# Patient Record
Sex: MALE | Race: BLACK OR AFRICAN AMERICAN | NOT HISPANIC OR LATINO | Employment: FULL TIME | URBAN - METROPOLITAN AREA
[De-identification: names, ages, dates, MRNs, and addresses within clinical notes are randomized per-mention and may not be internally consistent; named-entity substitution may affect disease eponyms.]

---

## 2017-09-21 ENCOUNTER — HOSPITAL ENCOUNTER (OUTPATIENT)
Facility: HOSPITAL | Age: 57
Discharge: HOME | End: 2017-09-23
Attending: EMERGENCY MEDICINE | Admitting: INTERNAL MEDICINE
Payer: COMMERCIAL

## 2017-09-21 ENCOUNTER — TELEPHONE (OUTPATIENT)
Dept: PULMONOLOGY | Facility: HOSPITAL | Age: 57
End: 2017-09-21

## 2017-09-21 DIAGNOSIS — R04.2 HEMOPTYSIS: ICD-10-CM

## 2017-09-21 DIAGNOSIS — J18.9 PNEUMONIA OF LEFT UPPER LOBE DUE TO INFECTIOUS ORGANISM: Primary | ICD-10-CM

## 2017-09-21 DIAGNOSIS — I25.10 CAD (CORONARY ARTERY DISEASE): ICD-10-CM

## 2017-09-21 PROBLEM — I10 ESSENTIAL HYPERTENSION: Chronic | Status: ACTIVE | Noted: 2017-09-21

## 2017-09-21 PROBLEM — E78.5 HYPERLIPIDEMIA: Chronic | Status: ACTIVE | Noted: 2017-09-21

## 2017-09-21 PROBLEM — Z72.0 TOBACCO USE: Chronic | Status: ACTIVE | Noted: 2017-09-21

## 2017-09-21 LAB
ALBUMIN SERPL BCP-MCNC: 3.7 G/DL
ALP SERPL-CCNC: 59 U/L
ALT SERPL W/O P-5'-P-CCNC: 21 U/L
ANION GAP SERPL CALC-SCNC: 10 MMOL/L
APTT BLDCRRT: 22.7 SEC
AST SERPL-CCNC: 16 U/L
BASOPHILS # BLD AUTO: 0 K/UL
BASOPHILS # BLD AUTO: 0.01 K/UL
BASOPHILS NFR BLD: 0 %
BASOPHILS NFR BLD: 0.1 %
BILIRUB SERPL-MCNC: 0.4 MG/DL
BNP SERPL-MCNC: 33 PG/ML
BUN SERPL-MCNC: 12 MG/DL
CALCIUM SERPL-MCNC: 8.9 MG/DL
CHLORIDE SERPL-SCNC: 105 MMOL/L
CO2 SERPL-SCNC: 26 MMOL/L
CREAT SERPL-MCNC: 0.9 MG/DL
DIFFERENTIAL METHOD: ABNORMAL
DIFFERENTIAL METHOD: ABNORMAL
EOSINOPHIL # BLD AUTO: 0 K/UL
EOSINOPHIL # BLD AUTO: 0 K/UL
EOSINOPHIL NFR BLD: 0 %
EOSINOPHIL NFR BLD: 0 %
ERYTHROCYTE [DISTWIDTH] IN BLOOD BY AUTOMATED COUNT: 14 %
ERYTHROCYTE [DISTWIDTH] IN BLOOD BY AUTOMATED COUNT: 14 %
EST. GFR  (AFRICAN AMERICAN): >60 ML/MIN/1.73 M^2
EST. GFR  (NON AFRICAN AMERICAN): >60 ML/MIN/1.73 M^2
GLUCOSE SERPL-MCNC: 110 MG/DL
HCT VFR BLD AUTO: 43 %
HCT VFR BLD AUTO: 43.4 %
HGB BLD-MCNC: 15.1 G/DL
HGB BLD-MCNC: 15.4 G/DL
INR PPP: 1
LYMPHOCYTES # BLD AUTO: 1.7 K/UL
LYMPHOCYTES # BLD AUTO: 2.5 K/UL
LYMPHOCYTES NFR BLD: 16.7 %
LYMPHOCYTES NFR BLD: 22.2 %
MCH RBC QN AUTO: 30.3 PG
MCH RBC QN AUTO: 30.5 PG
MCHC RBC AUTO-ENTMCNC: 35.1 G/DL
MCHC RBC AUTO-ENTMCNC: 35.5 G/DL
MCV RBC AUTO: 85 FL
MCV RBC AUTO: 87 FL
MONOCYTES # BLD AUTO: 0.1 K/UL
MONOCYTES # BLD AUTO: 0.8 K/UL
MONOCYTES NFR BLD: 1.2 %
MONOCYTES NFR BLD: 6.7 %
NEUTROPHILS # BLD AUTO: 8.1 K/UL
NEUTROPHILS # BLD AUTO: 8.5 K/UL
NEUTROPHILS NFR BLD: 70.8 %
NEUTROPHILS NFR BLD: 82.1 %
PLATELET # BLD AUTO: 217 K/UL
PLATELET # BLD AUTO: 230 K/UL
PMV BLD AUTO: 10.3 FL
PMV BLD AUTO: 10.6 FL
POTASSIUM SERPL-SCNC: 3.8 MMOL/L
PROT SERPL-MCNC: 7.6 G/DL
PROTHROMBIN TIME: 10.6 SEC
RBC # BLD AUTO: 4.95 M/UL
RBC # BLD AUTO: 5.09 M/UL
SODIUM SERPL-SCNC: 141 MMOL/L
WBC # BLD AUTO: 10.39 K/UL
WBC # BLD AUTO: 11.42 K/UL

## 2017-09-21 PROCEDURE — 63600175 PHARM REV CODE 636 W HCPCS: Performed by: EMERGENCY MEDICINE

## 2017-09-21 PROCEDURE — 25000003 PHARM REV CODE 250: Performed by: EMERGENCY MEDICINE

## 2017-09-21 PROCEDURE — 85025 COMPLETE CBC W/AUTO DIFF WBC: CPT

## 2017-09-21 PROCEDURE — 80053 COMPREHEN METABOLIC PANEL: CPT

## 2017-09-21 PROCEDURE — 99285 EMERGENCY DEPT VISIT HI MDM: CPT | Mod: 25

## 2017-09-21 PROCEDURE — 85730 THROMBOPLASTIN TIME PARTIAL: CPT

## 2017-09-21 PROCEDURE — 94640 AIRWAY INHALATION TREATMENT: CPT

## 2017-09-21 PROCEDURE — 25000242 PHARM REV CODE 250 ALT 637 W/ HCPCS: Performed by: EMERGENCY MEDICINE

## 2017-09-21 PROCEDURE — 96365 THER/PROPH/DIAG IV INF INIT: CPT

## 2017-09-21 PROCEDURE — 87040 BLOOD CULTURE FOR BACTERIA: CPT

## 2017-09-21 PROCEDURE — 83880 ASSAY OF NATRIURETIC PEPTIDE: CPT

## 2017-09-21 PROCEDURE — 96375 TX/PRO/DX INJ NEW DRUG ADDON: CPT

## 2017-09-21 PROCEDURE — 85610 PROTHROMBIN TIME: CPT

## 2017-09-21 PROCEDURE — 36415 COLL VENOUS BLD VENIPUNCTURE: CPT

## 2017-09-21 PROCEDURE — 25000003 PHARM REV CODE 250: Performed by: NURSE PRACTITIONER

## 2017-09-21 PROCEDURE — 25500020 PHARM REV CODE 255: Performed by: EMERGENCY MEDICINE

## 2017-09-21 PROCEDURE — 85025 COMPLETE CBC W/AUTO DIFF WBC: CPT | Mod: 91

## 2017-09-21 PROCEDURE — G0378 HOSPITAL OBSERVATION PER HR: HCPCS

## 2017-09-21 PROCEDURE — 63600175 PHARM REV CODE 636 W HCPCS: Performed by: NURSE PRACTITIONER

## 2017-09-21 PROCEDURE — 99900035 HC TECH TIME PER 15 MIN (STAT)

## 2017-09-21 RX ORDER — ASPIRIN 81 MG/1
81 TABLET ORAL DAILY
COMMUNITY
End: 2017-09-25 | Stop reason: ALTCHOICE

## 2017-09-21 RX ORDER — BENAZEPRIL HYDROCHLORIDE 40 MG/1
40 TABLET ORAL DAILY
COMMUNITY

## 2017-09-21 RX ORDER — PANTOPRAZOLE SODIUM 40 MG/10ML
40 INJECTION, POWDER, LYOPHILIZED, FOR SOLUTION INTRAVENOUS DAILY
Status: DISCONTINUED | OUTPATIENT
Start: 2017-09-22 | End: 2017-09-23 | Stop reason: HOSPADM

## 2017-09-21 RX ORDER — EZETIMIBE 10 MG/1
10 TABLET ORAL DAILY
Status: DISCONTINUED | OUTPATIENT
Start: 2017-09-22 | End: 2017-09-23 | Stop reason: HOSPADM

## 2017-09-21 RX ORDER — ATORVASTATIN CALCIUM 80 MG/1
80 TABLET, FILM COATED ORAL DAILY
COMMUNITY

## 2017-09-21 RX ORDER — METHYLPREDNISOLONE SOD SUCC 125 MG
125 VIAL (EA) INJECTION
Status: COMPLETED | OUTPATIENT
Start: 2017-09-21 | End: 2017-09-21

## 2017-09-21 RX ORDER — IPRATROPIUM BROMIDE AND ALBUTEROL SULFATE 2.5; .5 MG/3ML; MG/3ML
3 SOLUTION RESPIRATORY (INHALATION)
Status: COMPLETED | OUTPATIENT
Start: 2017-09-21 | End: 2017-09-21

## 2017-09-21 RX ORDER — ALBUTEROL SULFATE 90 UG/1
1-2 AEROSOL, METERED RESPIRATORY (INHALATION) EVERY 6 HOURS PRN
Qty: 1 INHALER | Refills: 0 | Status: SHIPPED | OUTPATIENT
Start: 2017-09-21 | End: 2017-09-23 | Stop reason: HOSPADM

## 2017-09-21 RX ORDER — CLOPIDOGREL BISULFATE 75 MG/1
75 TABLET ORAL DAILY
Status: DISCONTINUED | OUTPATIENT
Start: 2017-09-22 | End: 2017-09-22

## 2017-09-21 RX ORDER — ASPIRIN 81 MG/1
81 TABLET ORAL DAILY
Status: DISCONTINUED | OUTPATIENT
Start: 2017-09-22 | End: 2017-09-22

## 2017-09-21 RX ORDER — BENAZEPRIL HYDROCHLORIDE 20 MG/1
40 TABLET ORAL DAILY
Status: DISCONTINUED | OUTPATIENT
Start: 2017-09-22 | End: 2017-09-23 | Stop reason: HOSPADM

## 2017-09-21 RX ORDER — SODIUM CHLORIDE 9 MG/ML
INJECTION, SOLUTION INTRAVENOUS CONTINUOUS
Status: DISCONTINUED | OUTPATIENT
Start: 2017-09-21 | End: 2017-09-22

## 2017-09-21 RX ORDER — ASPIRIN 325 MG
81 TABLET, DELAYED RELEASE (ENTERIC COATED) ORAL DAILY
Status: ON HOLD | COMMUNITY
End: 2017-09-21 | Stop reason: CLARIF

## 2017-09-21 RX ORDER — MELOXICAM 15 MG/1
15 TABLET ORAL DAILY
COMMUNITY

## 2017-09-21 RX ORDER — PREDNISONE 20 MG/1
40 TABLET ORAL DAILY
Qty: 10 TABLET | Refills: 0 | Status: SHIPPED | OUTPATIENT
Start: 2017-09-21 | End: 2017-09-23 | Stop reason: HOSPADM

## 2017-09-21 RX ORDER — FUROSEMIDE 20 MG/1
20 TABLET ORAL 2 TIMES DAILY
COMMUNITY

## 2017-09-21 RX ORDER — CLOPIDOGREL BISULFATE 75 MG/1
75 TABLET ORAL DAILY
COMMUNITY

## 2017-09-21 RX ORDER — PANTOPRAZOLE SODIUM 40 MG/1
40 TABLET, DELAYED RELEASE ORAL DAILY
COMMUNITY

## 2017-09-21 RX ORDER — CHOLECALCIFEROL (VITAMIN D3) 25 MCG
2000 TABLET ORAL DAILY
COMMUNITY

## 2017-09-21 RX ORDER — NEBIVOLOL 10 MG/1
10 TABLET ORAL DAILY
Status: DISCONTINUED | OUTPATIENT
Start: 2017-09-22 | End: 2017-09-23 | Stop reason: HOSPADM

## 2017-09-21 RX ORDER — AMOXICILLIN AND CLAVULANATE POTASSIUM 875; 125 MG/1; MG/1
TABLET, FILM COATED ORAL
Refills: 0 | COMMUNITY
Start: 2017-09-20 | End: 2017-09-25 | Stop reason: ALTCHOICE

## 2017-09-21 RX ORDER — NEBIVOLOL 2.5 MG/1
10 TABLET ORAL DAILY
COMMUNITY

## 2017-09-21 RX ORDER — ONDANSETRON 2 MG/ML
4 INJECTION INTRAMUSCULAR; INTRAVENOUS EVERY 12 HOURS PRN
Status: DISCONTINUED | OUTPATIENT
Start: 2017-09-21 | End: 2017-09-23 | Stop reason: HOSPADM

## 2017-09-21 RX ORDER — FUROSEMIDE 20 MG/1
20 TABLET ORAL 2 TIMES DAILY
Status: DISCONTINUED | OUTPATIENT
Start: 2017-09-21 | End: 2017-09-23 | Stop reason: HOSPADM

## 2017-09-21 RX ORDER — EZETIMIBE 10 MG/1
10 TABLET ORAL DAILY
COMMUNITY

## 2017-09-21 RX ORDER — AMLODIPINE BESYLATE 10 MG/1
10 TABLET ORAL DAILY
COMMUNITY

## 2017-09-21 RX ORDER — ATORVASTATIN CALCIUM 40 MG/1
80 TABLET, FILM COATED ORAL DAILY
Status: DISCONTINUED | OUTPATIENT
Start: 2017-09-22 | End: 2017-09-23 | Stop reason: HOSPADM

## 2017-09-21 RX ORDER — MOXIFLOXACIN HYDROCHLORIDE 400 MG/250ML
400 INJECTION, SOLUTION INTRAVENOUS
Status: COMPLETED | OUTPATIENT
Start: 2017-09-21 | End: 2017-09-21

## 2017-09-21 RX ORDER — AMLODIPINE BESYLATE 10 MG/1
10 TABLET ORAL DAILY
Status: DISCONTINUED | OUTPATIENT
Start: 2017-09-22 | End: 2017-09-23 | Stop reason: HOSPADM

## 2017-09-21 RX ADMIN — SODIUM CHLORIDE: 0.9 INJECTION, SOLUTION INTRAVENOUS at 10:09

## 2017-09-21 RX ADMIN — DOXYCYCLINE 100 MG: 100 INJECTION, POWDER, LYOPHILIZED, FOR SOLUTION INTRAVENOUS at 10:09

## 2017-09-21 RX ADMIN — CEFTRIAXONE 1 G: 1 INJECTION, SOLUTION INTRAVENOUS at 10:09

## 2017-09-21 RX ADMIN — FUROSEMIDE 20 MG: 20 TABLET ORAL at 10:09

## 2017-09-21 RX ADMIN — IOHEXOL 100 ML: 350 INJECTION, SOLUTION INTRAVENOUS at 12:09

## 2017-09-21 RX ADMIN — MOXIFLOXACIN HYDROCHLORIDE 400 MG: 400 INJECTION, SOLUTION INTRAVENOUS at 02:09

## 2017-09-21 RX ADMIN — PHENYLEPHRINE HYDROCHLORIDE 2 SPRAY: 0.5 SPRAY NASAL at 02:09

## 2017-09-21 RX ADMIN — METHYLPREDNISOLONE SODIUM SUCCINATE 125 MG: 125 INJECTION, POWDER, FOR SOLUTION INTRAMUSCULAR; INTRAVENOUS at 11:09

## 2017-09-21 RX ADMIN — IPRATROPIUM BROMIDE AND ALBUTEROL SULFATE 3 ML: .5; 3 SOLUTION RESPIRATORY (INHALATION) at 12:09

## 2017-09-21 NOTE — ED NOTES
Call to [facility] telemetry, report given to UNIQUE Haney. Will call nurse when patient leaves with ETA.

## 2017-09-21 NOTE — ED PROVIDER NOTES
Encounter Date: 9/21/2017       History     Chief Complaint   Patient presents with    Coughing up blood     since Tuesday, saw PCP that put him on antibiotics and gave a steroid shot. Told him he needs xrays.      The history is provided by the patient.   Cough   This is a recurrent problem. The current episode started two days ago. The problem occurs every few hours. The problem has been gradually worsening. The cough is productive of sputum. There has been no fever. Associated symptoms include shortness of breath. Pertinent negatives include no chest pain, no chills, no sweats, no weight loss, no headaches, no rhinorrhea, no sore throat, no myalgias and no wheezing. Treatments tried: augmentin. He is a smoker. His past medical history does not include bronchitis, pneumonia, bronchiectasis, COPD, emphysema or asthma.     Pt saw PCP yesterday and rx'd augmentin.  Coughing intermittently - worse today and cont. To cough up blood every now and then.  Ordered a chest x-ray but pat. Did not get it done.  Went to work today and had a coughing spell c blood once again so he came to the ED for eval.      Review of patient's allergies indicates:  No Known Allergies  Past Medical History:   Diagnosis Date    Anticoagulant long-term use     Coronary artery disease     Hypercholesteremia     Hypertension      Past Surgical History:   Procedure Laterality Date    CARDIAC CATHETERIZATION      HERNIA REPAIR      KIDNEY STONE SURGERY      SHOULDER SURGERY Left      Family History   Problem Relation Age of Onset    Heart disease Mother     Stroke Father     Cancer Brother      Social History   Substance Use Topics    Smoking status: Current Every Day Smoker    Smokeless tobacco: Never Used    Alcohol use No     Review of Systems   Constitutional: Negative for chills, fever and weight loss.   HENT: Negative for rhinorrhea and sore throat.    Respiratory: Positive for cough and shortness of breath. Negative for  "wheezing.         + coughing up blood.   Cardiovascular: Negative for chest pain.   Gastrointestinal: Negative for nausea.   Genitourinary: Negative for dysuria.   Musculoskeletal: Negative for back pain and myalgias.   Skin: Negative for rash.   Neurological: Negative for weakness and headaches.   Hematological: Does not bruise/bleed easily.   All other systems reviewed and are negative.      Physical Exam     Initial Vitals [09/21/17 1114]   BP Pulse Resp Temp SpO2   (!) 140/76 68 18 99 °F (37.2 °C) 95 %      MAP       97.33         Vitals:    09/21/17 1114 09/21/17 1136 09/21/17 1215   BP: (!) 140/76     Pulse: 68 67 67   Resp: 18 18 18   Temp: 99 °F (37.2 °C)     TempSrc: Oral     SpO2: 95% 100% 100%   Weight: 99.3 kg (219 lb)     Height: 5' 10" (1.778 m)       Physical Exam    Nursing note and vitals reviewed.  Constitutional: He appears well-developed and well-nourished.   HENT:   Head: Normocephalic and atraumatic.   Mouth/Throat: Oropharynx is clear and moist.   Eyes: EOM are normal. Pupils are equal, round, and reactive to light.   Neck: Normal range of motion. Neck supple.   Cardiovascular: Normal rate, regular rhythm, normal heart sounds and intact distal pulses.   Pulmonary/Chest: No accessory muscle usage. No tachypnea. No respiratory distress. He has wheezes. He has no rhonchi.   Abdominal: Soft. Bowel sounds are normal. There is no rebound.   Musculoskeletal: Normal range of motion. He exhibits no edema.   Neurological: He is alert and oriented to person, place, and time. He has normal strength. No cranial nerve deficit or sensory deficit.   Skin: Skin is warm and dry. No rash noted.   Psychiatric: He has a normal mood and affect. His behavior is normal. Judgment and thought content normal.         ED Course   Procedures  Labs Reviewed   CBC W/ AUTO DIFFERENTIAL   COMPREHENSIVE METABOLIC PANEL   PROTIME-INR   APTT        Results for orders placed or performed during the hospital encounter of 09/21/17 "   CBC auto differential   Result Value Ref Range    WBC 11.42 3.90 - 12.70 K/uL    RBC 5.09 4.60 - 6.20 M/uL    Hemoglobin 15.4 14.0 - 18.0 g/dL    Hematocrit 43.4 40.0 - 54.0 %    MCV 85 82 - 98 fL    MCH 30.3 27.0 - 31.0 pg    MCHC 35.5 32.0 - 36.0 g/dL    RDW 14.0 11.5 - 14.5 %    Platelets 230 150 - 350 K/uL    MPV 10.6 9.2 - 12.9 fL    Gran # 8.1 (H) 1.8 - 7.7 K/uL    Lymph # 2.5 1.0 - 4.8 K/uL    Mono # 0.8 0.3 - 1.0 K/uL    Eos # 0.0 0.0 - 0.5 K/uL    Baso # 0.01 0.00 - 0.20 K/uL    Gran% 70.8 38.0 - 73.0 %    Lymph% 22.2 18.0 - 48.0 %    Mono% 6.7 4.0 - 15.0 %    Eosinophil% 0.0 0.0 - 8.0 %    Basophil% 0.1 0.0 - 1.9 %    Differential Method Automated    Comprehensive metabolic panel   Result Value Ref Range    Sodium 141 136 - 145 mmol/L    Potassium 3.8 3.5 - 5.1 mmol/L    Chloride 105 95 - 110 mmol/L    CO2 26 23 - 29 mmol/L    Glucose 110 70 - 110 mg/dL    BUN, Bld 12 6 - 20 mg/dL    Creatinine 0.9 0.5 - 1.4 mg/dL    Calcium 8.9 8.7 - 10.5 mg/dL    Total Protein 7.6 6.0 - 8.4 g/dL    Albumin 3.7 3.5 - 5.2 g/dL    Total Bilirubin 0.4 0.1 - 1.0 mg/dL    Alkaline Phosphatase 59 55 - 135 U/L    AST 16 10 - 40 U/L    ALT 21 10 - 44 U/L    Anion Gap 10 8 - 16 mmol/L    eGFR if African American >60.0 >60 mL/min/1.73 m^2    eGFR if non African American >60.0 >60 mL/min/1.73 m^2   Protime-INR   Result Value Ref Range    Prothrombin Time 10.6 9.0 - 12.5 sec    INR 1.0 0.8 - 1.2   APTT   Result Value Ref Range    aPTT 22.7 21.0 - 32.0 sec   Brain natriuretic peptide   Result Value Ref Range    BNP 33 0 - 99 pg/mL                   Imaging Results          CTA Chest Non-Coronary (PE Study) (Final result)  Result time 09/21/17 12:59:16    Final result by Titus Snyder MD (09/21/17 12:59:16)                 Impression:        No pulmonary embolus.   Faint groundglass opacities in the left upper lobe, likely infectious or inflammatory in nature.  Probable bilateral adrenal adenomas this could be confirmed with MRI.         All CT scans at this facility use dose modulation, iterative reconstruction and/or weight based dosing when appropriate to reduce radiation dose to as low as reasonably achievable.       Electronically signed by: GINA MONTESINOS MD  Date:     09/21/17  Time:    12:59              Narrative:    Exam: CTA CHEST NON CORONARY    Clinical History:   Hemoptysis.  Rule out pulmonary embolus.    Technique: Axial CTA images performed through the chest after the administration of 100 cc intravenous contrast. Maximum intensity projections were performed and interpreted.    Findings:        There is no evidence of pulmonary embolus. Pulmonary artery caliber is normal. The heart, great vessels, and mediastinal structures are within normal limits. No thoracic adenopathy.    Faint groundglass opacities in the left upper lobe.  Mild centrilobular emphysema. No pleural effusions.    Bilateral adrenal nodules measuring 2.7 cm on the left and 1.3 cm on the right.    The bones are intact.                             X-Ray Chest PA And Lateral (Final result)  Result time 09/21/17 12:38:27    Final result by Delano Loya III, MD (09/21/17 12:38:27)                 Impression:         Negative two-view chest x-ray.      Electronically signed by: DELANO LOYA MD  Date:     09/21/17  Time:    12:38              Narrative:    Two-view chest x-ray.    Clinical indication: Asthma     Heart size is normal. The lung fields are clear. No acute pulmonary infiltrate.                                      ED Course    12:46 PM patient back from CT scan vital signs stable.  He is resting company.  He has had no hemoptysis since he's been in the emergency department.    1:07 PM Dr. Rubalcava called but no answer (out to lunch) - Dr. Ramsay paged.    1:28 PM I spoke with Dr. Ramsay with pulmonology and he agrees with current management.  He states continue the patient on the Augmentin for now to treat technique required pneumonia as  well as steroids with albuterol and he will call the patient tomorrow to confirm appointment with him in his office on Monday.  I discussed this with the patient and his family and they feel very comfortable with this treatment plan.  They will return emergent department immediately for any worsening signs or symptoms.    1:47 PM prior to leaving the department the patient's coughing a little bit worse and that his hemoptysis got more significant.  Now with paige blood produced with his cough.  I discussed the case once again with Dr. Ramsay and we feel the patient will be better served to be in the hospital overnight for further evaluation and Dr. Maria will evaluate him there.    I spoke c [facility] and they agree c current tx.  They request I write admit to [facility].  The patient and his family understand we cannot admit patients to this facility and they will need to be transferred to [facility] in [place].  They're very comfortable with this treatment plan.  He will need to be transported I [facility] ambulance service for continued cardiac monitoring as well as IV fluids.    2:16 PM Sunshine with hospital medicine called me once again with concerns that the patient does not meet inpatient criteria (not hypoxic, anemic, tachycardic, etc. ) and requests that I write admission to [facility].  She inquired if the patient would be more appropriate for outpatient treatment however I do not feel it is appropriate.  The patient is much more uncomfortable now with increasing dyspnea and cough with significant paige hemoptysis requiring prompt evaluation by pulmonology and monitoring overnight at a minimum.  For now his vital signs remain stable.  He complains that he has some fullness in the back of his throat like he is choking on something and he's got bright red blood in his posterior oropharynx.  He has blood in bilateral nares as well but we feel that just because he has been coughing up the blood in his posterior  oropharynx and as gotten into his naris posteriorly.      4:20 PM we are still waiting for him transport have a patient's bed is ready and the call for transport has been made.  The patient looks much improved.  He is resting comfortably with his family has not having any problems breathing and we've seen no additional episodes of significant bleeding/hemoptysis.  Vital Signs are stable.    1641 - [facility] here to transfer the patient to [facility] in [place].  The patient remained comfortable with stable vital signs.    Clinical Impression:   The primary encounter diagnosis was Pneumonia of left upper lobe due to infectious organism. A diagnosis of Hemoptysis was also pertinent to this visit.    Disposition:   Disposition: Admitted  Condition: Stable                        Alvin Zavaleta MD  09/21/17 1330       Alvin Zavaleta MD  09/21/17 1356       Alvin Zavaleta MD  09/21/17 1621       Alvin Zavaleta MD  09/21/17 1719       Alvin Zavaleta MD  09/21/17 1719

## 2017-09-22 ENCOUNTER — ANESTHESIA EVENT (OUTPATIENT)
Dept: ENDOSCOPY | Facility: HOSPITAL | Age: 57
End: 2017-09-22
Payer: COMMERCIAL

## 2017-09-22 ENCOUNTER — ANESTHESIA (OUTPATIENT)
Dept: ENDOSCOPY | Facility: HOSPITAL | Age: 57
End: 2017-09-22
Payer: COMMERCIAL

## 2017-09-22 VITALS — RESPIRATION RATE: 27 BRPM

## 2017-09-22 PROBLEM — I25.10 CAD (CORONARY ARTERY DISEASE): Chronic | Status: ACTIVE | Noted: 2017-09-22

## 2017-09-22 PROBLEM — J44.0 EMPHYSEMA WITH BOTH ACUTE AND CHRONIC BRONCHITIS: Chronic | Status: ACTIVE | Noted: 2017-09-22

## 2017-09-22 PROBLEM — J20.9 EMPHYSEMA WITH BOTH ACUTE AND CHRONIC BRONCHITIS: Status: ACTIVE | Noted: 2017-09-22

## 2017-09-22 PROBLEM — J43.9 EMPHYSEMA WITH BOTH ACUTE AND CHRONIC BRONCHITIS: Chronic | Status: ACTIVE | Noted: 2017-09-22

## 2017-09-22 PROBLEM — J43.9 EMPHYSEMA WITH BOTH ACUTE AND CHRONIC BRONCHITIS: Status: ACTIVE | Noted: 2017-09-22

## 2017-09-22 PROBLEM — J44.0 EMPHYSEMA WITH BOTH ACUTE AND CHRONIC BRONCHITIS: Status: ACTIVE | Noted: 2017-09-22

## 2017-09-22 PROBLEM — J20.9 EMPHYSEMA WITH BOTH ACUTE AND CHRONIC BRONCHITIS: Chronic | Status: ACTIVE | Noted: 2017-09-22

## 2017-09-22 LAB
ALBUMIN SERPL BCP-MCNC: 3.1 G/DL
ALP SERPL-CCNC: 51 U/L
ALT SERPL W/O P-5'-P-CCNC: 16 U/L
ANION GAP SERPL CALC-SCNC: 8 MMOL/L
APPEARANCE FLD: ABNORMAL
AST SERPL-CCNC: 13 U/L
BASOPHILS # BLD AUTO: 0.01 K/UL
BASOPHILS NFR BLD: 0.1 %
BILIRUB SERPL-MCNC: 0.3 MG/DL
BODY FLD TYPE: ABNORMAL
BUN SERPL-MCNC: 21 MG/DL
CALCIUM SERPL-MCNC: 8.6 MG/DL
CHLORIDE SERPL-SCNC: 109 MMOL/L
CO2 SERPL-SCNC: 24 MMOL/L
COLOR FLD: ABNORMAL
CREAT SERPL-MCNC: 0.8 MG/DL
DIFFERENTIAL METHOD: ABNORMAL
EOSINOPHIL # BLD AUTO: 0 K/UL
EOSINOPHIL NFR BLD: 0 %
ERYTHROCYTE [DISTWIDTH] IN BLOOD BY AUTOMATED COUNT: 13.9 %
EST. GFR  (AFRICAN AMERICAN): >60 ML/MIN/1.73 M^2
EST. GFR  (NON AFRICAN AMERICAN): >60 ML/MIN/1.73 M^2
GLUCOSE SERPL-MCNC: 104 MG/DL
HCT VFR BLD AUTO: 39.8 %
HGB BLD-MCNC: 13.7 G/DL
LYMPHOCYTES # BLD AUTO: 2.9 K/UL
LYMPHOCYTES NFR BLD: 19.2 %
LYMPHOCYTES NFR FLD MANUAL: 5 %
MCH RBC QN AUTO: 30.6 PG
MCHC RBC AUTO-ENTMCNC: 34.4 G/DL
MCV RBC AUTO: 89 FL
MONOCYTES # BLD AUTO: 1 K/UL
MONOCYTES NFR BLD: 6.9 %
MONOS+MACROS NFR FLD MANUAL: 5 %
NEUTROPHILS # BLD AUTO: 11 K/UL
NEUTROPHILS NFR BLD: 73.8 %
OTHER CELLS FLD MANUAL: 90 %
PATH INTERP FLD-IMP: NORMAL
PLATELET # BLD AUTO: 222 K/UL
PMV BLD AUTO: 10.3 FL
POTASSIUM SERPL-SCNC: 4.1 MMOL/L
PROT SERPL-MCNC: 6.5 G/DL
RBC # BLD AUTO: 4.47 M/UL
SODIUM SERPL-SCNC: 141 MMOL/L
WBC # BLD AUTO: 14.84 K/UL
WBC # FLD: 465 /CU MM

## 2017-09-22 PROCEDURE — G0378 HOSPITAL OBSERVATION PER HR: HCPCS

## 2017-09-22 PROCEDURE — 37000009 HC ANESTHESIA EA ADD 15 MINS: Performed by: INTERNAL MEDICINE

## 2017-09-22 PROCEDURE — 25000003 PHARM REV CODE 250: Performed by: FAMILY MEDICINE

## 2017-09-22 PROCEDURE — 25000003 PHARM REV CODE 250: Performed by: NURSE ANESTHETIST, CERTIFIED REGISTERED

## 2017-09-22 PROCEDURE — 25000003 PHARM REV CODE 250: Performed by: INTERNAL MEDICINE

## 2017-09-22 PROCEDURE — 93005 ELECTROCARDIOGRAM TRACING: CPT

## 2017-09-22 PROCEDURE — 80053 COMPREHEN METABOLIC PANEL: CPT

## 2017-09-22 PROCEDURE — 25000003 PHARM REV CODE 250: Performed by: NURSE PRACTITIONER

## 2017-09-22 PROCEDURE — 85025 COMPLETE CBC W/AUTO DIFF WBC: CPT

## 2017-09-22 PROCEDURE — 87107 FUNGI IDENTIFICATION MOLD: CPT

## 2017-09-22 PROCEDURE — 93010 ELECTROCARDIOGRAM REPORT: CPT | Mod: ,,, | Performed by: INTERNAL MEDICINE

## 2017-09-22 PROCEDURE — 31624 DX BRONCHOSCOPE/LAVAGE: CPT | Mod: RT | Performed by: INTERNAL MEDICINE

## 2017-09-22 PROCEDURE — 87116 MYCOBACTERIA CULTURE: CPT | Mod: 59

## 2017-09-22 PROCEDURE — 99205 OFFICE O/P NEW HI 60 MIN: CPT | Mod: 25,,, | Performed by: ORTHOPAEDIC SURGERY

## 2017-09-22 PROCEDURE — 25000242 PHARM REV CODE 250 ALT 637 W/ HCPCS: Performed by: NURSE PRACTITIONER

## 2017-09-22 PROCEDURE — 94640 AIRWAY INHALATION TREATMENT: CPT

## 2017-09-22 PROCEDURE — 88305 TISSUE EXAM BY PATHOLOGIST: CPT | Mod: 26,,, | Performed by: PATHOLOGY

## 2017-09-22 PROCEDURE — 63600175 PHARM REV CODE 636 W HCPCS: Performed by: EMERGENCY MEDICINE

## 2017-09-22 PROCEDURE — 88305 TISSUE EXAM BY PATHOLOGIST: CPT | Performed by: PATHOLOGY

## 2017-09-22 PROCEDURE — C9113 INJ PANTOPRAZOLE SODIUM, VIA: HCPCS | Performed by: EMERGENCY MEDICINE

## 2017-09-22 PROCEDURE — 87070 CULTURE OTHR SPECIMN AEROBIC: CPT | Mod: 59

## 2017-09-22 PROCEDURE — 89051 BODY FLUID CELL COUNT: CPT

## 2017-09-22 PROCEDURE — 87210 SMEAR WET MOUNT SALINE/INK: CPT | Mod: 91

## 2017-09-22 PROCEDURE — 63600175 PHARM REV CODE 636 W HCPCS: Performed by: NURSE ANESTHETIST, CERTIFIED REGISTERED

## 2017-09-22 PROCEDURE — 31575 DIAGNOSTIC LARYNGOSCOPY: CPT | Mod: ,,, | Performed by: ORTHOPAEDIC SURGERY

## 2017-09-22 PROCEDURE — 87205 SMEAR GRAM STAIN: CPT | Mod: 59

## 2017-09-22 PROCEDURE — 37000008 HC ANESTHESIA 1ST 15 MINUTES: Performed by: INTERNAL MEDICINE

## 2017-09-22 PROCEDURE — 63600175 PHARM REV CODE 636 W HCPCS: Performed by: NURSE PRACTITIONER

## 2017-09-22 PROCEDURE — 25000003 PHARM REV CODE 250: Performed by: ORTHOPAEDIC SURGERY

## 2017-09-22 PROCEDURE — 36415 COLL VENOUS BLD VENIPUNCTURE: CPT

## 2017-09-22 PROCEDURE — 87102 FUNGUS ISOLATION CULTURE: CPT

## 2017-09-22 PROCEDURE — 25000003 PHARM REV CODE 250: Performed by: EMERGENCY MEDICINE

## 2017-09-22 PROCEDURE — 31624 DX BRONCHOSCOPE/LAVAGE: CPT | Mod: RT,,, | Performed by: INTERNAL MEDICINE

## 2017-09-22 PROCEDURE — 99220 PR INITIAL OBSERVATION CARE,LEVL III: CPT | Mod: 25,,, | Performed by: INTERNAL MEDICINE

## 2017-09-22 PROCEDURE — 87015 SPECIMEN INFECT AGNT CONCNTJ: CPT | Mod: 59

## 2017-09-22 PROCEDURE — 88112 CYTOPATH CELL ENHANCE TECH: CPT | Mod: 26,,, | Performed by: PATHOLOGY

## 2017-09-22 RX ORDER — MUPIROCIN 20 MG/G
OINTMENT TOPICAL 2 TIMES DAILY
Status: DISCONTINUED | OUTPATIENT
Start: 2017-09-22 | End: 2017-09-23 | Stop reason: HOSPADM

## 2017-09-22 RX ORDER — BENZONATATE 100 MG/1
100 CAPSULE ORAL 3 TIMES DAILY PRN
Status: DISCONTINUED | OUTPATIENT
Start: 2017-09-22 | End: 2017-09-23 | Stop reason: HOSPADM

## 2017-09-22 RX ORDER — SODIUM CHLORIDE 9 MG/ML
INJECTION, SOLUTION INTRAVENOUS CONTINUOUS PRN
Status: DISCONTINUED | OUTPATIENT
Start: 2017-09-22 | End: 2017-09-25

## 2017-09-22 RX ORDER — HYDROCODONE BITARTRATE AND ACETAMINOPHEN 5; 325 MG/1; MG/1
1 TABLET ORAL EVERY 4 HOURS PRN
Status: DISCONTINUED | OUTPATIENT
Start: 2017-09-22 | End: 2017-09-23 | Stop reason: HOSPADM

## 2017-09-22 RX ORDER — PROPOFOL 10 MG/ML
VIAL (ML) INTRAVENOUS
Status: DISCONTINUED | OUTPATIENT
Start: 2017-09-22 | End: 2017-09-25

## 2017-09-22 RX ORDER — LIDOCAINE HYDROCHLORIDE 10 MG/ML
INJECTION INFILTRATION; PERINEURAL
Status: DISCONTINUED | OUTPATIENT
Start: 2017-09-22 | End: 2017-09-25

## 2017-09-22 RX ORDER — FENTANYL CITRATE 50 UG/ML
INJECTION, SOLUTION INTRAMUSCULAR; INTRAVENOUS
Status: DISCONTINUED | OUTPATIENT
Start: 2017-09-22 | End: 2017-09-25

## 2017-09-22 RX ORDER — LIDOCAINE HYDROCHLORIDE 40 MG/ML
4 SOLUTION TOPICAL ONCE
Status: COMPLETED | OUTPATIENT
Start: 2017-09-22 | End: 2017-09-22

## 2017-09-22 RX ORDER — SODIUM CHLORIDE, SODIUM LACTATE, POTASSIUM CHLORIDE, CALCIUM CHLORIDE 600; 310; 30; 20 MG/100ML; MG/100ML; MG/100ML; MG/100ML
INJECTION, SOLUTION INTRAVENOUS CONTINUOUS
Status: DISCONTINUED | OUTPATIENT
Start: 2017-09-22 | End: 2017-09-22

## 2017-09-22 RX ORDER — MIDAZOLAM HYDROCHLORIDE 1 MG/ML
INJECTION, SOLUTION INTRAMUSCULAR; INTRAVENOUS
Status: DISCONTINUED | OUTPATIENT
Start: 2017-09-22 | End: 2017-09-25

## 2017-09-22 RX ORDER — FUROSEMIDE 10 MG/ML
40 INJECTION INTRAMUSCULAR; INTRAVENOUS ONCE
Status: COMPLETED | OUTPATIENT
Start: 2017-09-22 | End: 2017-09-22

## 2017-09-22 RX ORDER — FUROSEMIDE 10 MG/ML
20 INJECTION INTRAMUSCULAR; INTRAVENOUS ONCE
Status: DISCONTINUED | OUTPATIENT
Start: 2017-09-22 | End: 2017-09-22

## 2017-09-22 RX ORDER — IPRATROPIUM BROMIDE AND ALBUTEROL SULFATE 2.5; .5 MG/3ML; MG/3ML
3 SOLUTION RESPIRATORY (INHALATION) EVERY 6 HOURS PRN
Status: DISCONTINUED | OUTPATIENT
Start: 2017-09-22 | End: 2017-09-23 | Stop reason: HOSPADM

## 2017-09-22 RX ORDER — GLYCOPYRROLATE 0.2 MG/ML
INJECTION INTRAMUSCULAR; INTRAVENOUS
Status: DISCONTINUED | OUTPATIENT
Start: 2017-09-22 | End: 2017-09-25

## 2017-09-22 RX ADMIN — PROPOFOL 200 MG: 10 INJECTION, EMULSION INTRAVENOUS at 10:09

## 2017-09-22 RX ADMIN — SODIUM CHLORIDE: 0.9 INJECTION, SOLUTION INTRAVENOUS at 08:09

## 2017-09-22 RX ADMIN — HYDROCODONE BITARTRATE AND ACETAMINOPHEN 1 TABLET: 5; 325 TABLET ORAL at 03:09

## 2017-09-22 RX ADMIN — SODIUM CHLORIDE: 0.9 INJECTION, SOLUTION INTRAVENOUS at 10:09

## 2017-09-22 RX ADMIN — DOXYCYCLINE 100 MG: 100 INJECTION, POWDER, LYOPHILIZED, FOR SOLUTION INTRAVENOUS at 12:09

## 2017-09-22 RX ADMIN — SODIUM CHLORIDE, SODIUM LACTATE, POTASSIUM CHLORIDE, AND CALCIUM CHLORIDE: .6; .31; .03; .02 INJECTION, SOLUTION INTRAVENOUS at 10:09

## 2017-09-22 RX ADMIN — FENTANYL CITRATE 50 MCG: 50 INJECTION, SOLUTION INTRAMUSCULAR; INTRAVENOUS at 10:09

## 2017-09-22 RX ADMIN — IPRATROPIUM BROMIDE AND ALBUTEROL SULFATE 3 ML: .5; 3 SOLUTION RESPIRATORY (INHALATION) at 02:09

## 2017-09-22 RX ADMIN — MUPIROCIN: 20 OINTMENT TOPICAL at 08:09

## 2017-09-22 RX ADMIN — SALINE NASAL SPRAY 2 SPRAY: 1.5 SOLUTION NASAL at 04:09

## 2017-09-22 RX ADMIN — FUROSEMIDE 20 MG: 20 TABLET ORAL at 03:09

## 2017-09-22 RX ADMIN — DOXYCYCLINE 100 MG: 100 INJECTION, POWDER, LYOPHILIZED, FOR SOLUTION INTRAVENOUS at 08:09

## 2017-09-22 RX ADMIN — SALINE NASAL SPRAY 2 SPRAY: 1.5 SOLUTION NASAL at 03:09

## 2017-09-22 RX ADMIN — CEFTRIAXONE 1 G: 1 INJECTION, SOLUTION INTRAVENOUS at 08:09

## 2017-09-22 RX ADMIN — PANTOPRAZOLE SODIUM 40 MG: 40 INJECTION, POWDER, FOR SOLUTION INTRAVENOUS at 03:09

## 2017-09-22 RX ADMIN — PROPOFOL 50 MG: 10 INJECTION, EMULSION INTRAVENOUS at 10:09

## 2017-09-22 RX ADMIN — LIDOCAINE HYDROCHLORIDE 50 MG: 10 INJECTION, SOLUTION INFILTRATION; PERINEURAL at 10:09

## 2017-09-22 RX ADMIN — LIDOCAINE HYDROCHLORIDE 4 ML: 40 SOLUTION TOPICAL at 10:09

## 2017-09-22 RX ADMIN — AMLODIPINE BESYLATE 10 MG: 10 TABLET ORAL at 03:09

## 2017-09-22 RX ADMIN — GLYCOPYRROLATE 0.2 MG: 0.2 INJECTION INTRAMUSCULAR; INTRAVENOUS at 10:09

## 2017-09-22 RX ADMIN — MIDAZOLAM HYDROCHLORIDE 2 MG: 1 INJECTION, SOLUTION INTRAMUSCULAR; INTRAVENOUS at 10:09

## 2017-09-22 RX ADMIN — Medication 1 SPRAY: at 02:09

## 2017-09-22 RX ADMIN — FUROSEMIDE 40 MG: 10 INJECTION, SOLUTION INTRAMUSCULAR; INTRAVENOUS at 09:09

## 2017-09-22 NOTE — ASSESSMENT & PLAN NOTE
- No angina.  - Continue ASA, Plavix, BB, and statin therapy.  - Follows with Dr. Wells outpatient.

## 2017-09-22 NOTE — PLAN OF CARE
Problem: Patient Care Overview  Goal: Plan of Care Review  Outcome: Ongoing (interventions implemented as appropriate)  Care plan reviewed and agree with current plan of action. Adjustments made to current plan of care. Suction @ bedside; CBC this am to monitor H&H

## 2017-09-22 NOTE — HPI
"Mr. Schwab is a 55yo AA male with a PMHx of CAD with remote PCI (DAMON) of RCA in 2013, HTN, HLD, GERD, OA, and tobacco use, who presented to the ED with c/o hemoptysis since Tuesday.  Associated SOB, nose bleed, and sore throat.  Denies any chest pain, palpitations, LSOAN, wheezing, orthopnea, PND, abdominal pain, N/V/D, dysphagia, dyspepsia, rhinorrhea, lightheadedness/dizziness, syncope, weight loss, or fever.  Patient reports 5 episodes of blood streaked sputum from Tuesday to Thursday.  Thursday morning while at work, patient coughed and reports he "felt something pop loose in my throat", and noticed paige blood with clots.  While in ED, he had another episode stating he coughed up "straight blood".  He saw PCP on 9/20, given steroid and Augmentin.  Patient is on ASA and Plavix.  CT chest revealed faint groundglass opacities in the left upper lobe, likely infectious or inflammatory in nature.  H&H stable at 15/43.  Case discussed with Pulmonology.  Hospital Medicine consulted for admission.  "

## 2017-09-22 NOTE — SUBJECTIVE & OBJECTIVE
Past Medical History:   Diagnosis Date    Anticoagulant long-term use     Arthritis     Coronary artery disease     PCI (DAMON) of RCA in 2013    GERD (gastroesophageal reflux disease)     Hypercholesteremia     Hypertension     Kidney stones        Past Surgical History:   Procedure Laterality Date    CORONARY ANGIOPLASTY WITH STENT PLACEMENT  2013    HERNIA REPAIR      KIDNEY STONE SURGERY      SHOULDER SURGERY Left        Review of patient's allergies indicates:  No Known Allergies    Family History     Problem Relation (Age of Onset)    Cancer Sister    Cardiomyopathy Sister    Crohn's disease Sister    Heart disease Mother, Brother, Brother, Sister    Hypertension Father, Sister    Kidney disease Sister    Stroke Father    Valvular heart disease Mother        Social History Main Topics    Smoking status: Current Every Day Smoker     Packs/day: 0.50     Years: 35.00    Smokeless tobacco: Never Used    Alcohol use No    Drug use: No    Sexual activity: Not on file         Review of Systems   Constitutional: Negative.    HENT: Negative.    Eyes: Negative.    Respiratory: Positive for cough.    Cardiovascular: Negative.    Gastrointestinal: Negative.    Endocrine: Negative.    Genitourinary: Negative.    Musculoskeletal: Negative.    Skin: Negative.    Allergic/Immunologic: Negative.    Neurological: Negative.    Hematological: Negative.    Psychiatric/Behavioral: Negative.      Objective:     Vital Signs (Most Recent):  Temp: 97.1 °F (36.2 °C) (09/22/17 0449)  Pulse: 65 (09/22/17 0449)  Resp: 18 (09/22/17 0449)  BP: (!) 98/50 (09/22/17 0449)  SpO2: 95 % (09/22/17 0449) Vital Signs (24h Range):  Temp:  [97.1 °F (36.2 °C)-99 °F (37.2 °C)] 97.1 °F (36.2 °C)  Pulse:  [60-78] 65  Resp:  [15-32] 18  SpO2:  [94 %-100 %] 95 %  BP: ()/(50-79) 98/50     Weight: 101 kg (222 lb 9.6 oz)  Body mass index is 32.87 kg/m².      Intake/Output Summary (Last 24 hours) at 09/22/17 0809  Last data filed at 09/21/17  1528   Gross per 24 hour   Intake              250 ml   Output                0 ml   Net              250 ml       Physical Exam   Constitutional: He is oriented to person, place, and time. He appears well-developed and well-nourished. No distress.   HENT:   Head: Normocephalic and atraumatic.   Nose: Nose normal.   Mouth/Throat: Oropharynx is clear and moist. No oropharyngeal exudate.   Eyes: Conjunctivae and EOM are normal. Pupils are equal, round, and reactive to light. Left eye exhibits no discharge. No scleral icterus.   Neck: Normal range of motion. Neck supple.   Cardiovascular: Normal rate, regular rhythm and normal heart sounds.    Pulmonary/Chest: Effort normal and breath sounds normal. No respiratory distress. He has no wheezes. He has no rales. He exhibits no tenderness.   Abdominal: Soft. Bowel sounds are normal.   Musculoskeletal: Normal range of motion. He exhibits no deformity.   Neurological: He is alert and oriented to person, place, and time. No cranial nerve deficit.   Skin: Skin is warm and dry. Capillary refill takes less than 2 seconds. Nails show clubbing.   Nursing note and vitals reviewed.      Vents:       Lines/Drains/Airways     Peripheral Intravenous Line                 Peripheral IV - Single Lumen 09/21/17 1150 Right Antecubital less than 1 day         Peripheral IV - Single Lumen 09/21/17 1415 Left Antecubital less than 1 day                Significant Labs:    CBC/Anemia Profile:    Recent Labs  Lab 09/21/17  1150 09/21/17  2042 09/22/17  0543   WBC 11.42 10.39 14.84*   HGB 15.4 15.1 13.7*   HCT 43.4 43.0 39.8*    217 222   MCV 85 87 89   RDW 14.0 14.0 13.9        Chemistries:    Recent Labs  Lab 09/21/17  1150 09/22/17  0543    141   K 3.8 4.1    109   CO2 26 24   BUN 12 21*   CREATININE 0.9 0.8   CALCIUM 8.9 8.6*   ALBUMIN 3.7 3.1*   PROT 7.6 6.5   BILITOT 0.4 0.3   ALKPHOS 59 51*   ALT 21 16   AST 16 13       Respiratory Culture: No results for input(s):  GSRESP, RESPIRATORYC in the last 48 hours.  All pertinent labs within the past 24 hours have been reviewed.    Significant Imaging:   CT: I have reviewed all pertinent results/findings within the past 24 hours and my personal findings are:      There is no evidence of pulmonary embolus. Pulmonary artery caliber is normal. The heart, great vessels, and mediastinal structures are within normal limits. No thoracic adenopathy.    Faint groundglass opacities in the left upper lobe.  Mild centrilobular emphysema. No pleural effusions.    Bilateral adrenal nodules measuring 2.7 cm on the left and 1.3 cm on the right.    The bones are intact.

## 2017-09-22 NOTE — ASSESSMENT & PLAN NOTE
- H&H stable.  Monitor closely.  - Case discussed with Pulmonology.  - ASA and Plavix held.  - Bronchoscopy this AM.

## 2017-09-22 NOTE — PROGRESS NOTES
"[facility] Hospital Medicine  Progress Note    Patient Name: Stanley Schwab  MRN: [id number]  Patient Class: OP- Observation   Admission Date: 9/21/2017  Length of Stay: 0 days  Attending Physician: Rowena Alves MD  Primary Care Provider: Ever Rubalcava MD        Subjective:     Principal Problem:Hemoptysis    HPI:  Mr. Schwab is a 57yo AA male with a PMHx of CAD with remote PCI (DAMON) of RCA in 2013, HTN, HLD, GERD, OA, and tobacco use, who presented to the ED with c/o hemoptysis since Tuesday.  Associated SOB, nose bleed, and sore throat.  Denies any chest pain, palpitations, SLOAN, wheezing, orthopnea, PND, abdominal pain, N/V/D, dysphagia, dyspepsia, rhinorrhea, lightheadedness/dizziness, syncope, weight loss, or fever.  Patient reports 5 episodes of blood streaked sputum from Tuesday to Thursday.  Thursday morning while at work, patient coughed and reports he "felt something pop loose in my throat", and noticed paige blood with clots.  While in ED, he had another episode stating he coughed up "straight blood".  He saw PCP on 9/20, given steroid and Augmentin.  Patient is on ASA and Plavix.  CT chest revealed faint groundglass opacities in the left upper lobe, likely infectious or inflammatory in nature.  H&H stable at 15/43.  Case discussed with Pulmonology.  Hospital Medicine consulted for admission.    Hospital Course:  Patient admitted.  IV abx initiated.  CBC closely monitored.  This AM, patient appears comfortable without complaints.  H&H slightly down at 14/40.  VS stable.  ASA and Plavix held.  No further episodes of hemoptysis.  Case discussed with Pulmonology.  Bronchoscopy planned for this AM.        Review of Systems   Constitutional: Negative for activity change, chills, diaphoresis, fatigue, fever and unexpected weight change.   HENT: Positive for nosebleeds and sore throat. Negative for congestion, postnasal drip, rhinorrhea and trouble swallowing.    Eyes: Negative for visual " disturbance.   Respiratory: Positive for cough and shortness of breath. Negative for apnea, chest tightness and wheezing.         Hemoptysis   Cardiovascular: Negative for chest pain, palpitations and leg swelling.   Gastrointestinal: Negative for abdominal distention, abdominal pain, blood in stool, constipation, diarrhea, nausea and vomiting.        No hematemesis   Endocrine: Negative for polydipsia, polyphagia and polyuria.   Genitourinary: Negative for decreased urine volume, difficulty urinating, dysuria, frequency, hematuria and urgency.   Musculoskeletal: Negative for arthralgias, back pain, gait problem, joint swelling and myalgias.   Skin: Negative for pallor, rash and wound.   Neurological: Negative for dizziness, seizures, syncope, facial asymmetry, speech difficulty, weakness, light-headedness, numbness and headaches.   Psychiatric/Behavioral: Negative for confusion. The patient is not nervous/anxious.    All other systems reviewed and are negative.    Objective:     Vital Signs (Most Recent):  Temp: 97.7 °F (36.5 °C) (09/22/17 0811)  Pulse: (!) 58 (09/22/17 0811)  Resp: 18 (09/22/17 0811)  BP: 125/61 (09/22/17 0811)  SpO2: 95 % (09/22/17 0811) Vital Signs (24h Range):  Temp:  [97.1 °F (36.2 °C)-99 °F (37.2 °C)] 97.7 °F (36.5 °C)  Pulse:  [58-78] 58  Resp:  [15-32] 18  SpO2:  [94 %-100 %] 95 %  BP: ()/(50-79) 125/61     Weight: 101 kg (222 lb 9.6 oz)  Body mass index is 32.87 kg/m².    Physical Exam   Constitutional: He is oriented to person, place, and time. He appears well-developed and well-nourished. No distress.   HENT:   Head: Normocephalic and atraumatic.   Eyes: Conjunctivae and EOM are normal. Pupils are equal, round, and reactive to light.   Neck: Normal range of motion. Neck supple. No JVD present.   Cardiovascular: Normal rate, regular rhythm, normal heart sounds and intact distal pulses.  Exam reveals no gallop.    No murmur heard.  Pulmonary/Chest: Effort normal and breath sounds  normal. No respiratory distress. He has no wheezes.   Fine crackles to left lung.   Abdominal: Soft. Bowel sounds are normal. He exhibits no distension. There is no tenderness.   Musculoskeletal: Normal range of motion. He exhibits no edema, tenderness or deformity.   Neurological: He is alert and oriented to person, place, and time.   No focal deficits   Skin: Skin is warm and dry. No rash noted. No erythema.   Psychiatric: He has a normal mood and affect. Judgment normal.   Nursing note and vitals reviewed.       Significant Labs:   Recent Lab Results       09/22/17  0543 09/21/17  2042 09/21/17  1420 09/21/17  1415 09/21/17  1150      Albumin 3.1(L)    3.7     Alkaline Phosphatase 51(L)    59     ALT 16    21     Anion Gap 8    10     aPTT     22.7  Comment:  aPTT therapeutic range = 39-69 seconds     AST 13    16     Baso # 0.01 0.00   0.01     Basophil% 0.1 0.0   0.1     Total Bilirubin 0.3  Comment:  For infants and newborns, interpretation of results should be based  on gestational age, weight and in agreement with clinical  observations.  Premature Infant recommended reference ranges:  Up to 24 hours.............<8.0 mg/dL  Up to 48 hours............<12.0 mg/dL  3-5 days..................<15.0 mg/dL  6-29 days.................<15.0 mg/dL      0.4  Comment:  For infants and newborns, interpretation of results should be based  on gestational age, weight and in agreement with clinical  observations.  Premature Infant recommended reference ranges:  Up to 24 hours.............<8.0 mg/dL  Up to 48 hours............<12.0 mg/dL  3-5 days..................<15.0 mg/dL  6-29 days.................<15.0 mg/dL       Blood Culture, Routine   No Growth to date[P] No Growth to date[P]      BNP     33  Comment:  Values of less than 100 pg/ml are consistent with non-CHF populations.     BUN, Bld 21(H)    12     Calcium 8.6(L)    8.9     Chloride 109    105     CO2 24    26     Creatinine 0.8    0.9     Differential Method  Automated Automated   Automated     eGFR if  >60    >60.0     eGFR if non  >60  Comment:  Calculation used to obtain the estimated glomerular filtration  rate (eGFR) is the CKD-EPI equation. Since race is unknown   in our information system, the eGFR values for   -American and Non--American patients are given   for each creatinine result.      >60.0  Comment:  Calculation used to obtain the estimated glomerular filtration  rate (eGFR) is the CKD-EPI equation. Since race is unknown   in our information system, the eGFR values for   -American and Non--American patients are given   for each creatinine result.       Eos # 0.0 0.0   0.0     Eosinophil% 0.0 0.0   0.0     Glucose 104    110     Gran # 11.0(H) 8.5(H)   8.1(H)     Gran% 73.8(H) 82.1(H)   70.8     Hematocrit 39.8(L) 43.0   43.4     Hemoglobin 13.7(L) 15.1   15.4     Coumadin Monitoring INR     1.0  Comment:  Coumadin Therapy:  2.0 - 3.0 for INR for all indicators except mechanical heart valves  and antiphospholipid syndromes which should use 2.5 - 3.5.       Lymph # 2.9 1.7   2.5     Lymph% 19.2 16.7(L)   22.2     MCH 30.6 30.5   30.3     MCHC 34.4 35.1   35.5     MCV 89 87   85     Mono # 1.0 0.1(L)   0.8     Mono% 6.9 1.2(L)   6.7     MPV 10.3 10.3   10.6     Platelets 222 217   230     Potassium 4.1    3.8     Total Protein 6.5    7.6     Protime     10.6     RBC 4.47(L) 4.95   5.09     RDW 13.9 14.0   14.0     Sodium 141    141     WBC 14.84(H) 10.39   11.42                   All pertinent labs within the past 24 hours have been reviewed.    Significant Imaging:   Imaging Results          X-Ray Chest PA And Lateral (Final result)  Result time 09/22/17 08:28:36    Final result by Delano Fields III, MD (09/22/17 08:28:36)                 Impression:         Negative two-view chest x-ray.      Electronically signed by: DELANO FIELDS MD  Date:     09/22/17  Time:    08:28               Narrative:    Two-view chest x-ray.  The  Clinical indication: Pneumonia and hemoptysis     Compared to September 21.    Heart size is normal. The lung fields are clear with minimal chronic changes suggested. No consolidation or effusion.. No acute pulmonary infiltrate.                             CTA Chest Non-Coronary (PE Study) (Final result)  Result time 09/21/17 12:59:16    Final result by Gina Snyder MD (09/21/17 12:59:16)                 Impression:        No pulmonary embolus.   Faint groundglass opacities in the left upper lobe, likely infectious or inflammatory in nature.  Probable bilateral adrenal adenomas this could be confirmed with MRI.        All CT scans at this facility use dose modulation, iterative reconstruction and/or weight based dosing when appropriate to reduce radiation dose to as low as reasonably achievable.       Electronically signed by: GINA SNYDER MD  Date:     09/21/17  Time:    12:59              Narrative:    Exam: CTA CHEST NON CORONARY    Clinical History:   Hemoptysis.  Rule out pulmonary embolus.    Technique: Axial CTA images performed through the chest after the administration of 100 cc intravenous contrast. Maximum intensity projections were performed and interpreted.    Findings:        There is no evidence of pulmonary embolus. Pulmonary artery caliber is normal. The heart, great vessels, and mediastinal structures are within normal limits. No thoracic adenopathy.    Faint groundglass opacities in the left upper lobe.  Mild centrilobular emphysema. No pleural effusions.    Bilateral adrenal nodules measuring 2.7 cm on the left and 1.3 cm on the right.    The bones are intact.                             X-Ray Chest PA And Lateral (Final result)  Result time 09/21/17 12:38:27    Final result by Delano Fields III, MD (09/21/17 12:38:27)                 Impression:         Negative two-view chest x-ray.      Electronically signed by: DELANO FIELDS MD  Date:      09/21/17  Time:    12:38              Narrative:    Two-view chest x-ray.    Clinical indication: Asthma     Heart size is normal. The lung fields are clear. No acute pulmonary infiltrate.                             I have reviewed all pertinent imaging results/findings within the past 24 hours.        Assessment/Plan:      * Hemoptysis    - H&H stable.  Monitor closely.  - Case discussed with Pulmonology.  - ASA and Plavix held.  - Bronchoscopy this AM.        Pneumonia of left upper lobe due to infectious organism    -  Continue IV Rocephin and doxycycline for now.  - O2 supplementation and neb treatments as needed.  - Pneumonia vaccine prior to DC.        Emphysema with both acute and chronic bronchitis    - Continue steroids and neb treatments.  - Outpatient PFTs per Pulmonary.  - Smoking Cessation program outpatient.        Tobacco use    - Counseled on cessation.  - Nicotine patch.        Hyperlipidemia    - Continue home statin.        Essential hypertension    - BP well controlled.  - Continue home antihypertensives.        CAD (coronary artery disease) with h/o PCI (DAMON) of RCA in 2013    - No angina.  - Continue ASA and Plavix held as above.  - Continue BB and statin therapy.  - Follows with Dr. Wells outpatient.          VTE Risk Mitigation         Ordered     Medium Risk of VTE  Once      09/21/17 1915     Place sequential compression device  Until discontinued      09/21/17 1915              SONIA Haddad  Department of Hospital Medicine   [facility]

## 2017-09-22 NOTE — NURSING
It has come to my attention that pt was to be on a cardiac monitor, order placed last noc. Pt is leaving for Bronchoscopy and will be placed on tele monitor upon return.

## 2017-09-22 NOTE — SUBJECTIVE & OBJECTIVE
Past Medical History:   Diagnosis Date    Anticoagulant long-term use     Arthritis     Coronary artery disease     PCI (DAMON) of RCA in 2013    GERD (gastroesophageal reflux disease)     Hypercholesteremia     Hypertension     Kidney stones        Past Surgical History:   Procedure Laterality Date    CORONARY ANGIOPLASTY WITH STENT PLACEMENT  2013    HERNIA REPAIR      KIDNEY STONE SURGERY      SHOULDER SURGERY Left        Review of patient's allergies indicates:  No Known Allergies    No current facility-administered medications on file prior to encounter.      No current outpatient prescriptions on file prior to encounter.     Family History     Problem Relation (Age of Onset)    Cancer Sister    Cardiomyopathy Sister    Crohn's disease Sister    Heart disease Mother, Brother, Brother, Sister    Hypertension Father, Sister    Kidney disease Sister    Stroke Father    Valvular heart disease Mother        Social History Main Topics    Smoking status: Current Every Day Smoker     Packs/day: 0.50     Years: 35.00    Smokeless tobacco: Never Used    Alcohol use No    Drug use: No    Sexual activity: Not on file     Review of Systems   Constitutional: Negative for activity change, chills, diaphoresis, fatigue, fever and unexpected weight change.   HENT: Positive for nosebleeds and sore throat. Negative for congestion, postnasal drip, rhinorrhea and trouble swallowing.    Eyes: Negative for visual disturbance.   Respiratory: Positive for cough and shortness of breath. Negative for apnea, chest tightness and wheezing.         Hemoptysis   Cardiovascular: Negative for chest pain, palpitations and leg swelling.   Gastrointestinal: Negative for abdominal distention, abdominal pain, blood in stool, constipation, diarrhea, nausea and vomiting.        No hematemesis   Endocrine: Negative for polydipsia, polyphagia and polyuria.   Genitourinary: Negative for decreased urine volume, difficulty urinating, dysuria,  frequency, hematuria and urgency.   Musculoskeletal: Negative for arthralgias, back pain, gait problem, joint swelling and myalgias.   Skin: Negative for pallor, rash and wound.   Neurological: Negative for dizziness, seizures, syncope, facial asymmetry, speech difficulty, weakness, light-headedness, numbness and headaches.   Psychiatric/Behavioral: Negative for confusion. The patient is not nervous/anxious.    All other systems reviewed and are negative.    Objective:     Vital Signs (Most Recent):  Temp: 98.5 °F (36.9 °C) (09/21/17 1929)  Pulse: 60 (09/21/17 1929)  Resp: 18 (09/21/17 1929)  BP: 111/68 (09/21/17 1929)  SpO2: 95 % (09/21/17 1929) Vital Signs (24h Range):  Temp:  [98.5 °F (36.9 °C)-99 °F (37.2 °C)] 98.5 °F (36.9 °C)  Pulse:  [60-73] 60  Resp:  [15-32] 18  SpO2:  [94 %-100 %] 95 %  BP: (111-140)/(66-79) 111/68     Weight: 101 kg (222 lb 9.6 oz)  Body mass index is 32.87 kg/m².    Physical Exam   Constitutional: He is oriented to person, place, and time. He appears well-developed and well-nourished. No distress.   HENT:   Head: Normocephalic and atraumatic.   Eyes: Conjunctivae and EOM are normal. Pupils are equal, round, and reactive to light.   Neck: Normal range of motion. Neck supple. No JVD present.   Cardiovascular: Normal rate, regular rhythm, normal heart sounds and intact distal pulses.  Exam reveals no gallop.    No murmur heard.  Pulmonary/Chest: Effort normal and breath sounds normal. No respiratory distress. He has no wheezes.   Fine crackles to left lung.   Abdominal: Soft. Bowel sounds are normal. He exhibits no distension. There is no tenderness.   Musculoskeletal: Normal range of motion. He exhibits no edema, tenderness or deformity.   Neurological: He is alert and oriented to person, place, and time.   No focal deficits   Skin: Skin is warm and dry. No rash noted. No erythema.   Psychiatric: He has a normal mood and affect. Judgment normal.   Nursing note and vitals reviewed.        Significant Labs:   Recent Lab Results       09/21/17 2042 09/21/17  1150      Albumin  3.7     Alkaline Phosphatase  59     ALT  21     Anion Gap  10     aPTT  22.7  Comment:  aPTT therapeutic range = 39-69 seconds     AST  16     Baso # 0.00 0.01     Basophil% 0.0 0.1     Total Bilirubin  0.4  Comment:  For infants and newborns, interpretation of results should be based  on gestational age, weight and in agreement with clinical  observations.  Premature Infant recommended reference ranges:  Up to 24 hours.............<8.0 mg/dL  Up to 48 hours............<12.0 mg/dL  3-5 days..................<15.0 mg/dL  6-29 days.................<15.0 mg/dL       BNP  33  Comment:  Values of less than 100 pg/ml are consistent with non-CHF populations.     BUN, Bld  12     Calcium  8.9     Chloride  105     CO2  26     Creatinine  0.9     Differential Method Automated Automated     eGFR if African American  >60.0     eGFR if non   >60.0  Comment:  Calculation used to obtain the estimated glomerular filtration  rate (eGFR) is the CKD-EPI equation. Since race is unknown   in our information system, the eGFR values for   -American and Non--American patients are given   for each creatinine result.       Eos # 0.0 0.0     Eosinophil% 0.0 0.0     Glucose  110     Gran # 8.5(H) 8.1(H)     Gran% 82.1(H) 70.8     Hematocrit 43.0 43.4     Hemoglobin 15.1 15.4     Coumadin Monitoring INR  1.0  Comment:  Coumadin Therapy:  2.0 - 3.0 for INR for all indicators except mechanical heart valves  and antiphospholipid syndromes which should use 2.5 - 3.5.       Lymph # 1.7 2.5     Lymph% 16.7(L) 22.2     MCH 30.5 30.3     MCHC 35.1 35.5     MCV 87 85     Mono # 0.1(L) 0.8     Mono% 1.2(L) 6.7     MPV 10.3 10.6     Platelets 217 230     Potassium  3.8     Total Protein  7.6     Protime  10.6     RBC 4.95 5.09     RDW 14.0 14.0     Sodium  141     WBC 10.39 11.42         All pertinent labs within the past 24 hours  have been reviewed.    Significant Imaging:   Imaging Results          CTA Chest Non-Coronary (PE Study) (Final result)  Result time 09/21/17 12:59:16    Final result by Gina Snyder MD (09/21/17 12:59:16)                 Impression:        No pulmonary embolus.   Faint groundglass opacities in the left upper lobe, likely infectious or inflammatory in nature.  Probable bilateral adrenal adenomas this could be confirmed with MRI.        All CT scans at this facility use dose modulation, iterative reconstruction and/or weight based dosing when appropriate to reduce radiation dose to as low as reasonably achievable.       Electronically signed by: GINA SNYDER MD  Date:     09/21/17  Time:    12:59              Narrative:    Exam: CTA CHEST NON CORONARY    Clinical History:   Hemoptysis.  Rule out pulmonary embolus.    Technique: Axial CTA images performed through the chest after the administration of 100 cc intravenous contrast. Maximum intensity projections were performed and interpreted.    Findings:        There is no evidence of pulmonary embolus. Pulmonary artery caliber is normal. The heart, great vessels, and mediastinal structures are within normal limits. No thoracic adenopathy.    Faint groundglass opacities in the left upper lobe.  Mild centrilobular emphysema. No pleural effusions.    Bilateral adrenal nodules measuring 2.7 cm on the left and 1.3 cm on the right.    The bones are intact.                             X-Ray Chest PA And Lateral (Final result)  Result time 09/21/17 12:38:27    Final result by Delano Fields III, MD (09/21/17 12:38:27)                 Impression:         Negative two-view chest x-ray.      Electronically signed by: DELANO FIELDS MD  Date:     09/21/17  Time:    12:38              Narrative:    Two-view chest x-ray.    Clinical indication: Asthma     Heart size is normal. The lung fields are clear. No acute pulmonary infiltrate.                             I have  reviewed all pertinent imaging results/findings within the past 24 hours.

## 2017-09-22 NOTE — DISCHARGE INSTRUCTIONS
Flexible Bronchoscopy  A flexible bronchoscopy is an exam of the airways of your lungs. A thin, flexible tube called a bronchoscope is used. It has a light and small camera that allow the healthcare provider to view your airways.    Before your test  · Follow your healthcare provider's instructions carefully. If you dont, the exam may be canceled. Or you may need to take it again.  · If you are taking blood-thinning medicine, ask your healthcare provider if you should stop taking the medicine before this test.  · Have no food or drink for at least 8 hours before the test. Also, avoid smoking for 24 hours before the test.  · You will need to remove any dentures or removable devices from your mouth.  · Right before the test, you will be given sedating medicines to help you relax. The medicine may be given by an IV (intravenously) into one of your veins. In addition, your nose and throat may be numbed with a special spray to help prevent gagging and coughing.  · If you are having this test as an outpatient, make sure you have an adult friend or family member to drive you home.  During your test  Bronchoscopy takes 45 to 60 minutes and includes the following steps:  · You may be given medicine (anesthesia) so that you are unconscious or asleep during the procedure.  · The healthcare provider inserts the tube into your nose or mouth.  · If you have not been given anesthesia, you might feel a gagging sensation. To help ease this feeling, you will be told to swallow or take deep breaths. Your airway will remain open even with the tube in place. But you wont be able to talk.  · The provider checks your breathing passage. He or she may also remove tiny tissue samples for biopsy.  After your test  · You may have a mild sore throat or cough. Your voice may also be hoarse.  · Don't eat or drink until the anesthesia wears off.  · If you had a biopsy, you might see traces of blood being coughed up.  When to call your  healthcare provider  Call your healthcare provider right away if you have any of the following:  · Shortness of breath  · Chest pain  · Bleeding from your nose or throat  · Coughing up a large amount of blood  · A fever above 100.4°F (38°C) for more than 24 hours  Call 911  Call 911 if you have:  · Chest pain  · Severe shortness of breath   Date Last Reviewed: 12/1/2016  © 2664-5676 "[employer]. [address] [place] [address], [place] [address]. All rights reserved. This information is not intended as a substitute for professional medical care. Always follow your healthcare professional's instructions.

## 2017-09-22 NOTE — H&P
[facility] Hospital Medicine  History & Physical    Patient Name: Stanley Schwab  MRN: [id number]  Admission Date: 9/21/2017  Attending Physician: Rowena Alves MD   Primary Care Provider: Ever Rubalcava MD         Patient information was obtained from patient, spouse/SO, past medical records and ER records.     Subjective:     Principal Problem:Hemoptysis    Chief Complaint:   Chief Complaint   Patient presents with    Coughing up blood     since Tuesday, saw PCP that put him on antibiotics and gave a steroid shot. Told him he needs xrays.         HPI: The history is provided by the patient.   Cough   This is a recurrent problem. The current episode started two days ago. The problem occurs every few hours. The problem has been gradually worsening. The cough is productive of sputum. There has been no fever. Associated symptoms include shortness of breath. Pertinent negatives include no chest pain, no chills, no sweats, no weight loss, no headaches, no rhinorrhea, no sore throat, no myalgias and no wheezing. Treatments tried: augmentin. He is a smoker. His past medical history does not include bronchitis, pneumonia, bronchiectasis, COPD, emphysema or asthma.      Pt saw PCP yesterday and rx'd augmentin.  Coughing intermittently - worse today and cont. To cough up blood every now and then.  Ordered a chest x-ray but pat. Did not get it done.  Went to work today and had a coughing spell c blood once again so he came to the ED for eval.    Past Medical History:   Diagnosis Date    Anticoagulant long-term use     Arthritis     Coronary artery disease     PCI (DAMON) of RCA in 2013    GERD (gastroesophageal reflux disease)     Hypercholesteremia     Hypertension     Kidney stones        Past Surgical History:   Procedure Laterality Date    CORONARY ANGIOPLASTY WITH STENT PLACEMENT  2013    HERNIA REPAIR      KIDNEY STONE SURGERY      SHOULDER SURGERY Left        Review of patient's allergies  indicates:  No Known Allergies    No current facility-administered medications on file prior to encounter.      No current outpatient prescriptions on file prior to encounter.     Family History     Problem Relation (Age of Onset)    Cancer Sister    Cardiomyopathy Sister    Crohn's disease Sister    Heart disease Mother, Brother, Brother, Sister    Hypertension Father, Sister    Kidney disease Sister    Stroke Father    Valvular heart disease Mother        Social History Main Topics    Smoking status: Current Every Day Smoker     Packs/day: 0.50     Years: 35.00    Smokeless tobacco: Never Used    Alcohol use No    Drug use: No    Sexual activity: Not on file     Review of Systems   Constitutional: Negative for activity change, chills, diaphoresis, fatigue, fever and unexpected weight change.   HENT: Positive for nosebleeds and sore throat. Negative for congestion, postnasal drip, rhinorrhea and trouble swallowing.    Eyes: Negative for visual disturbance.   Respiratory: Positive for cough and shortness of breath. Negative for apnea, chest tightness and wheezing.         Hemoptysis   Cardiovascular: Negative for chest pain, palpitations and leg swelling.   Gastrointestinal: Negative for abdominal distention, abdominal pain, blood in stool, constipation, diarrhea, nausea and vomiting.        No hematemesis   Endocrine: Negative for polydipsia, polyphagia and polyuria.   Genitourinary: Negative for decreased urine volume, difficulty urinating, dysuria, frequency, hematuria and urgency.   Musculoskeletal: Negative for arthralgias, back pain, gait problem, joint swelling and myalgias.   Skin: Negative for pallor, rash and wound.   Neurological: Negative for dizziness, seizures, syncope, facial asymmetry, speech difficulty, weakness, light-headedness, numbness and headaches.   Psychiatric/Behavioral: Negative for confusion. The patient is not nervous/anxious.    All other systems reviewed and are  negative.    Objective:     Vital Signs (Most Recent):  Temp: 98.5 °F (36.9 °C) (09/21/17 1929)  Pulse: 60 (09/21/17 1929)  Resp: 18 (09/21/17 1929)  BP: 111/68 (09/21/17 1929)  SpO2: 95 % (09/21/17 1929) Vital Signs (24h Range):  Temp:  [98.5 °F (36.9 °C)-99 °F (37.2 °C)] 98.5 °F (36.9 °C)  Pulse:  [60-73] 60  Resp:  [15-32] 18  SpO2:  [94 %-100 %] 95 %  BP: (111-140)/(66-79) 111/68     Weight: 101 kg (222 lb 9.6 oz)  Body mass index is 32.87 kg/m².    Physical Exam   Constitutional: He is oriented to person, place, and time. He appears well-developed and well-nourished. No distress.   HENT:   Head: Normocephalic and atraumatic.   Eyes: Conjunctivae and EOM are normal. Pupils are equal, round, and reactive to light.   Neck: Normal range of motion. Neck supple. No JVD present.   Cardiovascular: Normal rate, regular rhythm, normal heart sounds and intact distal pulses.  Exam reveals no gallop.    No murmur heard.  Pulmonary/Chest: Effort normal and breath sounds normal. No respiratory distress. He has no wheezes.   Fine crackles to left lung.   Abdominal: Soft. Bowel sounds are normal. He exhibits no distension. There is no tenderness.   Musculoskeletal: Normal range of motion. He exhibits no edema, tenderness or deformity.   Neurological: He is alert and oriented to person, place, and time.   No focal deficits   Skin: Skin is warm and dry. No rash noted. No erythema.   Psychiatric: He has a normal mood and affect. Judgment normal.   Nursing note and vitals reviewed.       Significant Labs:   Recent Lab Results       09/21/17 2042 09/21/17  1150      Albumin  3.7     Alkaline Phosphatase  59     ALT  21     Anion Gap  10     aPTT  22.7  Comment:  aPTT therapeutic range = 39-69 seconds     AST  16     Baso # 0.00 0.01     Basophil% 0.0 0.1     Total Bilirubin  0.4  Comment:  For infants and newborns, interpretation of results should be based  on gestational age, weight and in agreement with  clinical  observations.  Premature Infant recommended reference ranges:  Up to 24 hours.............<8.0 mg/dL  Up to 48 hours............<12.0 mg/dL  3-5 days..................<15.0 mg/dL  6-29 days.................<15.0 mg/dL       BNP  33  Comment:  Values of less than 100 pg/ml are consistent with non-CHF populations.     BUN, Bld  12     Calcium  8.9     Chloride  105     CO2  26     Creatinine  0.9     Differential Method Automated Automated     eGFR if African American  >60.0     eGFR if non   >60.0  Comment:  Calculation used to obtain the estimated glomerular filtration  rate (eGFR) is the CKD-EPI equation. Since race is unknown   in our information system, the eGFR values for   -American and Non--American patients are given   for each creatinine result.       Eos # 0.0 0.0     Eosinophil% 0.0 0.0     Glucose  110     Gran # 8.5(H) 8.1(H)     Gran% 82.1(H) 70.8     Hematocrit 43.0 43.4     Hemoglobin 15.1 15.4     Coumadin Monitoring INR  1.0  Comment:  Coumadin Therapy:  2.0 - 3.0 for INR for all indicators except mechanical heart valves  and antiphospholipid syndromes which should use 2.5 - 3.5.       Lymph # 1.7 2.5     Lymph% 16.7(L) 22.2     MCH 30.5 30.3     MCHC 35.1 35.5     MCV 87 85     Mono # 0.1(L) 0.8     Mono% 1.2(L) 6.7     MPV 10.3 10.6     Platelets 217 230     Potassium  3.8     Total Protein  7.6     Protime  10.6     RBC 4.95 5.09     RDW 14.0 14.0     Sodium  141     WBC 10.39 11.42         All pertinent labs within the past 24 hours have been reviewed.    Significant Imaging:   Imaging Results          CTA Chest Non-Coronary (PE Study) (Final result)  Result time 09/21/17 12:59:16    Final result by Titus Snyder MD (09/21/17 12:59:16)                 Impression:        No pulmonary embolus.   Faint groundglass opacities in the left upper lobe, likely infectious or inflammatory in nature.  Probable bilateral adrenal adenomas this could be confirmed with  MRI.        All CT scans at this facility use dose modulation, iterative reconstruction and/or weight based dosing when appropriate to reduce radiation dose to as low as reasonably achievable.       Electronically signed by: GINA MONTESINOS MD  Date:     09/21/17  Time:    12:59              Narrative:    Exam: CTA CHEST NON CORONARY    Clinical History:   Hemoptysis.  Rule out pulmonary embolus.    Technique: Axial CTA images performed through the chest after the administration of 100 cc intravenous contrast. Maximum intensity projections were performed and interpreted.    Findings:        There is no evidence of pulmonary embolus. Pulmonary artery caliber is normal. The heart, great vessels, and mediastinal structures are within normal limits. No thoracic adenopathy.    Faint groundglass opacities in the left upper lobe.  Mild centrilobular emphysema. No pleural effusions.    Bilateral adrenal nodules measuring 2.7 cm on the left and 1.3 cm on the right.    The bones are intact.                             X-Ray Chest PA And Lateral (Final result)  Result time 09/21/17 12:38:27    Final result by Delano Fields III, MD (09/21/17 12:38:27)                 Impression:         Negative two-view chest x-ray.      Electronically signed by: DELANO FIELDS MD  Date:     09/21/17  Time:    12:38              Narrative:    Two-view chest x-ray.    Clinical indication: Asthma     Heart size is normal. The lung fields are clear. No acute pulmonary infiltrate.                             I have reviewed all pertinent imaging results/findings within the past 24 hours.        Assessment/Plan:     * Hemoptysis    - H&H stable.  Monitor closely.  - Will continue ASA and Plavix for now.  - Pulmonology consult.        Pneumonia of left upper lobe due to infectious organism    - IV Rocephin and doxycycline.  - O2 supplementation and neb treatments as needed.  - Pulmonology consult.        Tobacco use    - Counseled on  cessation.  - Nicotine patch.        Hyperlipidemia    - Continue home statin.        Essential hypertension    - BP controlled.  - Continue home antihypertensives.        CAD (coronary artery disease) with h/o PCI (DAMON) x 2 of OM1 and RCA in 2013    - No angina.  - Continue ASA, Plavix, BB, and statin therapy.  - Follows with Dr. Wells outpatient.          VTE Risk Mitigation         Ordered     Medium Risk of VTE  Once      09/21/17 1915     Place sequential compression device  Until discontinued      09/21/17 1915             SONIA Haddad  Department of Hospital Medicine   [facility]

## 2017-09-22 NOTE — ASSESSMENT & PLAN NOTE
- Continue steroids and neb treatments.  - Outpatient PFTs per Pulmonary.  - Smoking Cessation program outpatient.

## 2017-09-22 NOTE — PROGRESS NOTES
"Called to room per pt's wife; pt sitting @ side of bed w/ large amount of bloody sputum in trash can; pt states he has a "scratchy" feeling in throat that precedes episodes of spitting bloody sputum; coarse BS noted upon ausculation; denies SOB or dyspnea; pt also verbalized nose bleed; currently nose bleed stopped; AndreaNP notified and arrived to bedside for evaluation; new orders on chart; Respiratory notified of consult and new med orders; suction set up in room and pt educated on how to use yanker; return demonstration performed correctly; will continue to monitor  "

## 2017-09-22 NOTE — DISCHARGE SUMMARY
"[facility]  Discharge Summary     Patient ID:  Stanley Schwab  [id number]  56 y.o.  1960    Admit date: 9/21/2017    Discharge Date and Time:  09/22/2017 11:38 AM    Admitting Physician: Harry Swift MD     Discharge Provider: Wan Senior    Reason for Admission: Hemoptysis [R04.2]  Pneumonia of left upper lobe due to infectious organism [J18.1]  Hemoptysis [R04.2]    Admission Condition: good    Procedures Performed: Procedure(s) (LRB):  BRONCHOSCOPY (Bilateral)    Hospital Course (synopsis of major diagnoses, care, treatment, and services provided during the course of the hospital stay):    Bronchoscopy performed      Consults: ENT    Significant Diagnostic Studies: bronchoscopy:      Final Diagnoses:    Principal Problem: Hemoptysis   Secondary Diagnoses:   Active Hospital Problems    Diagnosis  POA    *Hemoptysis [R04.2]  Yes     Priority: 1 - High    Emphysema with both acute and chronic bronchitis [J44.0, J20.9]  Unknown     Priority: 2      Chronic    Tobacco use [Z72.0]  Yes     Priority: 2      Chronic    Pneumonia of left upper lobe due to infectious organism [J18.1]  Yes    CAD (coronary artery disease) with h/o PCI (DAMON) of RCA in 2013 [I25.10]  Unknown     Chronic    Essential hypertension [I10]  Unknown     Chronic    Hyperlipidemia [E78.5]  Unknown     Chronic      Resolved Hospital Problems    Diagnosis Date Resolved POA   No resolved problems to display.       Discharged Condition: good    Discharge Exam:  /65   Pulse 87   Temp 98.2 °F (36.8 °C) (Temporal)   Resp 17   Ht 5' 9" (1.753 m)   Wt 101 kg (222 lb 9.6 oz)   SpO2 98%   BMI 32.87 kg/m²   General appearance: alert, appears stated age and moderately obese  Lungs: clear to auscultation bilaterally    Disposition: Final discharge disposition not confirmed    Follow Up/Patient Instructions:     Medications:  Transfer Medications (for Discharge Readmit only):   Current Facility-Administered " Medications   Medication Dose Route Frequency Provider Last Rate Last Dose    0.9%  NaCl infusion   Intravenous Continuous Alvin Zavaleta  mL/hr at 09/22/17 0852      albuterol-ipratropium 2.5mg-0.5mg/3mL nebulizer solution 3 mL  3 mL Nebulization Q6H PRN Delano Roberto, QUINCY   3 mL at 09/22/17 0221    amlodipine tablet 10 mg  10 mg Oral Daily [facility], FNP        atorvastatin tablet 80 mg  80 mg Oral Daily Marybeth B. Buuck, FNP        benazepril tablet 40 mg  40 mg Oral Daily Marybeth B. Buuck, FNP        benzonatate capsule 100 mg  100 mg Oral TID PRN Delano Roberto, QUINCY        cefTRIAXone (ROCEPHIN) 1 g in dextrose 5 % 50 mL IVPB  1 g Intravenous Q24H Marybeth B. Buuck, FNP   1 g at 09/21/17 2210    clopidogrel tablet 75 mg  75 mg Oral Daily Marybeth B. Buuck, FNP        doxycycline (VIBRAMYCIN) 100mg in dextrose 5% 250 mL IVPB (ready to mix)  100 mg Intravenous Q12H [facility]. [facility], FNP   100 mg at 09/21/17 2211    ezetimibe tablet 10 mg  10 mg Oral Daily Marybeth B. Buuck, FNP        furosemide tablet 20 mg  20 mg Oral BID [facility], FNP   20 mg at 09/21/17 2211    lactated ringers infusion   Intravenous Continuous Wan Senior MD 75 mL/hr at 09/22/17 1021      nebivolol tablet 10 mg  10 mg Oral Daily Marybeth B. Buuck, FNP        ondansetron injection 4 mg  4 mg Intravenous Q12H PRN Alvin Zavaleta MD        pantoprazole injection 40 mg  40 mg Intravenous Daily Alvin Zavaleta MD         Facility-Administered Medications Ordered in Other Encounters   Medication Dose Route Frequency Provider Last Rate Last Dose    0.9%  NaCl infusion   Intravenous Continuous PRN Brenda Zazueta, CRNA        fentaNYL injection    PRN Brenda Zazueta CRNA   50 mcg at 09/22/17 1050    glycopyrrolate injection    PRN Brenda Zazueta CRNA   0.2 mg at 09/22/17 1044    lidocaine HCL 10 mg/ml (1%) injection    PRN Brenda Zazueta, CRNA   50 mg at 09/22/17 1047    midazolam injection    Intravenous PRN Brenda Zazueta, CRNA   2 mg at 09/22/17 1044    propofol (DIPRIVAN) 10 mg/mL infusion    PRN Brenda Zazueta, CRNA   50 mg at 09/22/17 1049     No discharge procedures on file.  Follow-up Information     Ever Rubalcava MD. Schedule an appointment as soon as possible for a visit in 2 days.    Specialty:  Family Medicine  Why:  Follow up with your primary doctor in the next 2-3 days for a re-check.  Return to the emergency department for any worsening signs or symptoms.  Contact information:  [address] [place] [address] [phone]             Ever Ramsay MD. Schedule an appointment as soon as possible for a visit in 4 days.    Specialty:  Pulmonary Disease  Why:  Dr. Ramsay will call you tomorrow to arrange a follow up appointment on Monday, Sept 25th.  Return to the Emergency Department immediately for any worsening sign.  Contact information:  [address]  [place] [address] [phone]                 Activity: bedrest  Diet: clear liquids, advance as tolerated  Wound Care: none needed    ENT eval    Signed:  Wan Senior  9/22/2017  11:38 AM

## 2017-09-22 NOTE — HPI
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  Care everywhere was seen 09/20 by PCP with hemoptysis  Plavix for stent 2013

## 2017-09-22 NOTE — NURSING
Stopped IV fluids per Dr Senior. Will apply new dressing as there is some new blood drainage, and saline lock for now.

## 2017-09-22 NOTE — NURSING
Call from lab to say that Quantiferon testing had to be cancelled as they are not collected on Friday's due to the inability to run them over the weekend. Secure chat msg sent to Dr Senior, ordering provider.

## 2017-09-22 NOTE — ASSESSMENT & PLAN NOTE
- No angina.  - Continue ASA and Plavix held as above.  - Continue BB and statin therapy.  - Follows with Dr. Wells outpatient.

## 2017-09-22 NOTE — PLAN OF CARE
L:hao in bed with eyes closed, occasisonal coughing and moaning.  Denies pain or needs at this time.  Will continue to monitor.

## 2017-09-22 NOTE — CONSULTS
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  Care everywhere was seen 09/20 by PCP with hemoptysis  Plavix for stent 2013    Hospital/ICU Course:  IVF  Abx  NPO  Night nurse report recurrent hemoptysis overnight    Past Medical History:   Diagnosis Date    Anticoagulant long-term use     Arthritis     Coronary artery disease     PCI (DAMON) of RCA in 2013    GERD (gastroesophageal reflux disease)     Hypercholesteremia     Hypertension     Kidney stones        Past Surgical History:   Procedure Laterality Date    CORONARY ANGIOPLASTY WITH STENT PLACEMENT  2013    HERNIA REPAIR      KIDNEY STONE SURGERY      SHOULDER SURGERY Left        Review of patient's allergies indicates:  No Known Allergies    Family History     Problem Relation (Age of Onset)    Cancer Sister    Cardiomyopathy Sister    Crohn's disease Sister    Heart disease Mother, Brother, Brother, Sister    Hypertension Father, Sister    Kidney disease Sister    Stroke Father    Valvular heart disease Mother        Social History Main Topics    Smoking status: Current Every Day Smoker     Packs/day: 0.50     Years: 35.00    Smokeless tobacco: Never Used    Alcohol use No    Drug use: No    Sexual activity: Not on file         Review of Systems   Constitutional: Negative.    HENT: Negative.    Eyes: Negative.    Respiratory: Positive for cough.     Cardiovascular: Negative.    Gastrointestinal: Negative.    Endocrine: Negative.    Genitourinary: Negative.    Musculoskeletal: Negative.    Skin: Negative.    Allergic/Immunologic: Negative.    Neurological: Negative.    Hematological: Negative.    Psychiatric/Behavioral: Negative.      Objective:     Vital Signs (Most Recent):  Temp: 97.1 °F (36.2 °C) (09/22/17 0449)  Pulse: 65 (09/22/17 0449)  Resp: 18 (09/22/17 0449)  BP: (!) 98/50 (09/22/17 0449)  SpO2: 95 % (09/22/17 0449) Vital Signs (24h Range):  Temp:  [97.1 °F (36.2 °C)-99 °F (37.2 °C)] 97.1 °F (36.2 °C)  Pulse:  [60-78] 65  Resp:  [15-32] 18  SpO2:  [94 %-100 %] 95 %  BP: ()/(50-79) 98/50     Weight: 101 kg (222 lb 9.6 oz)  Body mass index is 32.87 kg/m².      Intake/Output Summary (Last 24 hours) at 09/22/17 0809  Last data filed at 09/21/17 1528   Gross per 24 hour   Intake              250 ml   Output                0 ml   Net              250 ml       Physical Exam   Constitutional: He is oriented to person, place, and time. He appears well-developed and well-nourished. No distress.   HENT:   Head: Normocephalic and atraumatic.   Nose: Nose normal.   Mouth/Throat: Oropharynx is clear and moist. No oropharyngeal exudate.   Eyes: Conjunctivae and EOM are normal. Pupils are equal, round, and reactive to light. Left eye exhibits no discharge. No scleral icterus.   Neck: Normal range of motion. Neck supple.   Cardiovascular: Normal rate, regular rhythm and normal heart sounds.    Pulmonary/Chest: Effort normal and breath sounds normal. No respiratory distress. He has no wheezes. He has no rales. He exhibits no tenderness.   Abdominal: Soft. Bowel sounds are normal.   Musculoskeletal: Normal range of motion. He exhibits no deformity.   Neurological: He is alert and oriented to person, place, and time. No cranial nerve deficit.   Skin: Skin is warm and dry. Capillary refill takes less than 2 seconds. Nails show clubbing.   Nursing note and vitals  reviewed.      Vents:       Lines/Drains/Airways     Peripheral Intravenous Line                 Peripheral IV - Single Lumen 09/21/17 1150 Right Antecubital less than 1 day         Peripheral IV - Single Lumen 09/21/17 1415 Left Antecubital less than 1 day                Significant Labs:    CBC/Anemia Profile:    Recent Labs  Lab 09/21/17  1150 09/21/17  2042 09/22/17  0543   WBC 11.42 10.39 14.84*   HGB 15.4 15.1 13.7*   HCT 43.4 43.0 39.8*    217 222   MCV 85 87 89   RDW 14.0 14.0 13.9        Chemistries:    Recent Labs  Lab 09/21/17  1150 09/22/17  0543    141   K 3.8 4.1    109   CO2 26 24   BUN 12 21*   CREATININE 0.9 0.8   CALCIUM 8.9 8.6*   ALBUMIN 3.7 3.1*   PROT 7.6 6.5   BILITOT 0.4 0.3   ALKPHOS 59 51*   ALT 21 16   AST 16 13       Respiratory Culture: No results for input(s): GSRESP, RESPIRATORYC in the last 48 hours.  All pertinent labs within the past 24 hours have been reviewed.    Significant Imaging:   CT: I have reviewed all pertinent results/findings within the past 24 hours and my personal findings are:      There is no evidence of pulmonary embolus. Pulmonary artery caliber is normal. The heart, great vessels, and mediastinal structures are within normal limits. No thoracic adenopathy.    Faint groundglass opacities in the left upper lobe.  Mild centrilobular emphysema. No pleural effusions.    Bilateral adrenal nodules measuring 2.7 cm on the left and 1.3 cm on the right.    The bones are intact.    Assessment/Plan:     Pulmonary   * Hemoptysis    Differentials: infectious, bronchial bleed from scarring, neoplasm, out sources like ENT  Bronchoscopy necessary  CT shows blood in right airway  Asp and Plavix need to be held, DW cardiology  If torrential bleeding with possible airway compromise : intubate  May need IR intervention    My diagnostic impression and work-up plan were discussed at length with patient. Risks were discussed. BRONCHOSCOPY procedure. Complications of  the procedure discussed in detail with patient. Complications including but not limited to infection that may require hospital admission, bleeding that may require blood transfusion and or hospital admission, perforation of the lung which may require surgery,chance of injury to the throat, windpipe or bronchial tubes, laryngospam, coughing, aspiration, hypoxemia or cardiac arrythmias. Patient expressed and verbalized understanding. The material risks of anesthesia in connection with the procedure including brain damage, paralysis from the neck downwards, paralysis from the waist downwards, loss of function of an arm or a leg, disfigurement (incluing scars)and death were discussed with patient who expressedand verbalized understanding. Alternate treatments and material risks associated with such alternatives were discussed with pateint. These include radiologic surveillance with minimal risk and sugery with an indeterminate risk. The material risks of refusing the procedure was discussed in detail. This includes no diagnosis or confirmation of diagnisis and rendering of appropriate treatment the risk of which depends on the nature of the diagnosed illness. Patient expressed and verbalized understanding. Procedure scheduled for date 09/22/2017. Consent signed. Orders entered. Coagulation studies per orders.   All questions were answered and the patient expressed understanding            Pneumonia of left upper lobe due to infectious organism    Abx  Prevnar 13 prior to discharge  SCD for now        Emphysema with both acute and chronic bronchitis    Smoking cessation  Steriods  Inhaler  Outpatient PFT        Other   Tobacco use    Assistance with smoking cessation was offered, including:  []  Medications  [x]  Counseling  []  Printed Information on Smoking Cessation  []  Referral to a Smoking Cessation Program    Patient was counseled regarding smoking for 3-10 minutes.               Thank you for your consult. I will  follow-up with patient. Please contact us if you have any additional questions.     Wan Senior MD  Critical Care Medicine  [facility] [place]

## 2017-09-22 NOTE — CONSULTS
[facility] -   Otorhinolaryngology-Head & Neck Surgery  Consult Note    Patient Name: Stanley Schwab  MRN: [id number]  Code Status: Full Code  Admission Date: 9/21/2017  Hospital Length of Stay: 0 days  Attending Physician: Rowena Alves MD  Primary Care Provider: Ever Rubalcava MD    Consults  Subjective:     Chief Complaint/Reason for Admission: Hemoptysis    History of Present Illness: Patient is a very pleasant 56 year old gentleman who has recently been admitted to the hospital with complaints of hemoptysis.  He says that he has recently had several episodes of epistaxis, all self limited.  Before he begins to cough, he has a sensation of there being a tickle in the back of his throat, and he then coughs up blood.  He has no issues with nasal congestion or obstruction.  He is a smoker, and has a long smoking history.  He is on Plavix, and has a diagnosis of hypertension.  He has had a bronchoscopy, but it was done through the endotracheal tube, and Dr. Senior wanted evaluation of his nasal cavity as well as hypopharynx and larynx.    Medications:  Continuous Infusions:   sodium chloride 0.9% 125 mL/hr at 09/22/17 0852    lactated Ringers 75 mL/hr at 09/22/17 1021     Scheduled Meds:   amlodipine  10 mg Oral Daily    atorvastatin  80 mg Oral Daily    benazepril  40 mg Oral Daily    cefTRIAXone (ROCEPHIN) IVPB  1 g Intravenous Q24H    clopidogrel  75 mg Oral Daily    doxycycline (VIBRAMYCIN) IVPB  100 mg Intravenous Q12H    ezetimibe  10 mg Oral Daily    furosemide  20 mg Oral BID    nebivolol  10 mg Oral Daily    pantoprazole  40 mg Intravenous Daily     PRN Meds:albuterol-ipratropium 2.5mg-0.5mg/3mL, benzonatate, ondansetron     No current facility-administered medications on file prior to encounter.      No current outpatient prescriptions on file prior to encounter.       Review of patient's allergies indicates:  No Known Allergies    Past Medical History:   Diagnosis Date     Anticoagulant long-term use     Arthritis     Coronary artery disease     PCI (DAMON) of RCA in 2013    GERD (gastroesophageal reflux disease)     Hypercholesteremia     Hypertension     Kidney stones      Past Surgical History:   Procedure Laterality Date    CORONARY ANGIOPLASTY WITH STENT PLACEMENT  2013    HERNIA REPAIR      KIDNEY STONE SURGERY      SHOULDER SURGERY Left      Family History     Problem Relation (Age of Onset)    Arthritis Mother    Cancer Sister    Cardiomyopathy Sister    Crohn's disease Sister    Heart disease Mother, Brother, Brother, Sister    Hypertension Father, Sister    Kidney disease Sister    Stroke Father    Valvular heart disease Mother        Social History Main Topics    Smoking status: Current Every Day Smoker     Packs/day: 0.50     Years: 35.00    Smokeless tobacco: Never Used    Alcohol use No    Drug use: No    Sexual activity: Not on file     Review of Systems   Constitutional: Positive for fatigue. Negative for fever and unexpected weight change.   HENT: Positive for nosebleeds. Negative for congestion, ear discharge, ear pain, facial swelling, hearing loss, postnasal drip, rhinorrhea, sinus pressure, sneezing, sore throat, tinnitus, trouble swallowing and voice change.    Eyes: Negative for discharge, redness and itching.   Respiratory: Positive for cough and shortness of breath. Negative for choking and wheezing.    Cardiovascular: Negative for chest pain and palpitations.   Gastrointestinal: Negative for abdominal pain.        No reflux.   Musculoskeletal: Negative for neck pain.   Neurological: Negative for dizziness, facial asymmetry, light-headedness and headaches.   Hematological: Negative for adenopathy. Does not bruise/bleed easily.   Psychiatric/Behavioral: Negative for agitation, behavioral problems, confusion and decreased concentration.     Objective:     Vital Signs (Most Recent):  Temp: 98.3 °F (36.8 °C) (09/22/17 1215)  Pulse: 74 (09/22/17  1215)  Resp: 19 (09/22/17 1200)  BP: 121/71 (09/22/17 1215)  SpO2: 95 % (09/22/17 1215) Vital Signs (24h Range):  Temp:  [97.1 °F (36.2 °C)-98.6 °F (37 °C)] 98.3 °F (36.8 °C)  Pulse:  [58-90] 74  Resp:  [5-33] 19  SpO2:  [92 %-98 %] 95 %  BP: ()/(50-79) 121/71     Weight: 101 kg (222 lb 9.6 oz)  Body mass index is 32.87 kg/m².         Physical Exam   Constitutional: He is oriented to person, place, and time. Vital signs are normal. He appears well-developed and well-nourished. No distress.   HENT:   Head: Normocephalic and atraumatic.   Right Ear: Hearing, external ear and ear canal normal.   Left Ear: Hearing, external ear and ear canal normal.   Nose: Nose normal. No mucosal edema, rhinorrhea, nasal deformity or septal deviation.   Mouth/Throat: Uvula is midline, oropharynx is clear and moist and mucous membranes are normal. No trismus in the jaw. Normal dentition. No uvula swelling. No oropharyngeal exudate or posterior oropharyngeal edema.   Eyes: Conjunctivae and EOM are normal. Pupils are equal, round, and reactive to light. Right eye exhibits no chemosis. Left eye exhibits no chemosis. Right conjunctiva is not injected. Left conjunctiva is not injected. No scleral icterus.   Neck: Trachea normal and phonation normal. No tracheal tenderness present. No tracheal deviation present. No thyroid mass and no thyromegaly present.   Cardiovascular: Intact distal pulses.    Pulmonary/Chest: Effort normal. No accessory muscle usage or stridor. No respiratory distress.   Lymphadenopathy:        Head (right side): No submental, no submandibular, no preauricular and no posterior auricular adenopathy present.        Head (left side): No submental, no submandibular, no preauricular and no posterior auricular adenopathy present.     He has no cervical adenopathy.        Right cervical: No superficial cervical and no deep cervical adenopathy present.       Left cervical: No superficial cervical and no deep cervical  adenopathy present.   Neurological: He is alert and oriented to person, place, and time. No cranial nerve deficit.   Skin: Skin is warm and dry. No rash noted. No erythema.   Psychiatric: He has a normal mood and affect. His behavior is normal. Thought content normal.     Due to indication in patient's history, presentation or risk factors,  a fiber optic exam was performed.    SEPARATE PROCEDURE NOTE:    ANESTHESIA:  Topical xylocaine with sandoval-synephrine  FINDINGS:  Normal exam, fresh blood (no active bleeding) in left nasal cavity and nasopharynx, small amount of blood on lingual surface of epiglottis      PROCEDURE:  After verbal consent was obtained, the flexible scope was passed through the patient's nasal cavity without difficulty.  The nasopharynx (adenoid pad) and eustachian tube orifices were first visualized and were found to be normal, without masses or irregularity.  The posterior pharyngeal wall and base of tongue were then examined and no mass or irregular tissue was seen.  The scope was then advanced to the larynx, and the epiglottis, valleculae, and piriform sinuses were normal, without masses or mucosal irregularity.  The false vocal folds and true vocal folds were then examined and were found to have normal mobility (full abduction and adduction) and no masses or mucosal irregularity was seen.  The arytenoids and the interarytenoid area were normal.      Significant Labs:  CBC:   Recent Labs  Lab 09/22/17  0543   WBC 14.84*   RBC 4.47*   HGB 13.7*   HCT 39.8*      MCV 89   MCH 30.6   MCHC 34.4     Coagulation:   Recent Labs  Lab 09/21/17  1150   LABPROT 10.6   INR 1.0   APTT 22.7         Assessment/Plan:     Active Diagnoses:    Diagnosis Date Noted POA    PRINCIPAL PROBLEM:  Hemoptysis [R04.2] 09/21/2017 Yes    CAD (coronary artery disease) with h/o PCI (DAMON) of RCA in 2013 [I25.10] 09/22/2017 Yes     Chronic    Emphysema with both acute and chronic bronchitis [J44.0, J20.9] 09/22/2017  Unknown     Chronic    Pneumonia of left upper lobe due to infectious organism [J18.1] 09/21/2017 Yes    Essential hypertension [I10] 09/21/2017 Unknown     Chronic    Hyperlipidemia [E78.5] 09/21/2017 Unknown     Chronic    Tobacco use [Z72.0] 09/21/2017 Yes     Chronic      Problems Resolved During this Admission:    Diagnosis Date Noted Date Resolved POA     VTE Risk Mitigation         Ordered     Medium Risk of VTE  Once      09/21/17 1915     Place sequential compression device  Until discontinued      09/21/17 1915          Patient's exam today most consistent with epistaxis as the cause of his recent hemoptysis.  I would recommend he start with saline hourly while awake, and afrin as needed for active bleeding.  He currently has a nasal cannula, I would not recommend a nasal cannula, only humidified O2 via face mask.  I would also recommend mupirocin twice daily for 10 days to his nose.  We discussed that with smoking he is at high risk for a head and neck cancer, and that is likely also contributing to his epistaxis.  He is interested in quitting.  I would like to see him back in two weeks in clinic to repeat his scope examination due to his smoking history to ensure that there is no lesion under the blood seen in his nasal cavity.  Call if additional assistance is needed.        Thank you for your consult. I will sign off. Please contact us if you have any additional questions.    Xin White MD  Otorhinolaryngology-Head & Neck Surgery  [facility]

## 2017-09-22 NOTE — PLAN OF CARE
CM went to patient's room to complete assessment and was advised by the nurse the patient was off the floor for testing.

## 2017-09-22 NOTE — NURSING
Pt c/o feeling hot, temp is normal at 98.8. Adjusted room temp lower. Bp is slightly elevated for pt at 140/84. o2 sat WNL. Pt c/o HA diffuse at 6-7. No pain meds ordered at this time. Pt is strict NPO until 1530 today, S/P bronchoscopy. Pt is shaking some, but denies any other sx, denies CP and no SOB. Pt ambulated well to [facility].   [facility] [place] to Marybeth NP to inquire about new orders.

## 2017-09-22 NOTE — HOSPITAL COURSE
IVF  Abx  NPO  Night nurse report recurrent hemoptysis overnight    09/23: at baseline  No further hemoptysis or epistaxis  Seen by Dr White

## 2017-09-22 NOTE — ASSESSMENT & PLAN NOTE
-  Continue IV Rocephin and doxycycline for now.  - O2 supplementation and neb treatments as needed.  - Pneumonia vaccine prior to DC.

## 2017-09-22 NOTE — ANESTHESIA PREPROCEDURE EVALUATION
09/22/2017  Stanley Schwab is a 56 y.o., male.    Pre-op Assessment    I have reviewed the Patient Summary Reports.     I have reviewed the Nursing Notes.   I have reviewed the Medications.     Review of Systems  Anesthesia Hx:  No problems with previous Anesthesia  Denies Family Hx of Anesthesia complications.   Denies Personal Hx of Anesthesia complications.   Social:  Smoker    Cardiovascular:   Exercise tolerance: good Hypertension, well controlled CAD asymptomatic CABG/stent  ECG has been reviewed.    Pulmonary:   COPD Asthma Shortness of breath hemoptysis   Renal/:   renal calculi    Hepatic/GI:   GERD    Neurological:  Neurology Normal    Endocrine:  Endocrine Normal        Physical Exam  General:  Well nourished    Airway/Jaw/Neck:  Airway Findings: Mouth Opening: Normal Mallampati: I      Dental:  Dental Findings: Upper Dentures   Chest/Lungs:  Chest/Lungs Findings: Clear to auscultation     Heart/Vascular:  Heart Findings: Rate: Normal  Rhythm: Regular Rhythm             Anesthesia Plan  Type of Anesthesia, risks & benefits discussed:  Anesthesia Type:  general  Patient's Preference:   Intra-op Monitoring Plan:   Intra-op Monitoring Plan Comments:   Post Op Pain Control Plan:   Post Op Pain Control Plan Comments:   Induction:   IV  Beta Blocker:  Patient is on a Beta-Blocker and has received one dose within the past 24 hours (No further documentation required).       Informed Consent: Patient understands risks and agrees with Anesthesia plan.  Questions answered. Anesthesia consent signed with patient.  ASA Score: 3     Day of Surgery Review of History & Physical: I have interviewed and examined the patient. I have reviewed the patient's H&P dated:  There are no significant changes.

## 2017-09-22 NOTE — OP NOTE
Bronchoscopy performed  Needed intubation because LMA was not seating well  Normal airway anatomy  Old blood  Unable to lateralize source of bleeding  No new bleeding after lavage of airway  BAL RML  Procedure 14 mins  Washes, BAL, Cytology and Wbcc diff sent

## 2017-09-22 NOTE — SUBJECTIVE & OBJECTIVE
Past Medical History:   Diagnosis Date    Anticoagulant long-term use     Arthritis     Coronary artery disease     PCI (DAMON) of RCA in 2013    GERD (gastroesophageal reflux disease)     Hypercholesteremia     Hypertension     Kidney stones        Past Surgical History:   Procedure Laterality Date    CORONARY ANGIOPLASTY WITH STENT PLACEMENT  2013    HERNIA REPAIR      KIDNEY STONE SURGERY      SHOULDER SURGERY Left        Review of patient's allergies indicates:  No Known Allergies    No current facility-administered medications on file prior to encounter.      No current outpatient prescriptions on file prior to encounter.     Family History     Problem Relation (Age of Onset)    Cancer Sister    Cardiomyopathy Sister    Crohn's disease Sister    Heart disease Mother, Brother, Brother, Sister    Hypertension Father, Sister    Kidney disease Sister    Stroke Father    Valvular heart disease Mother        Social History Main Topics    Smoking status: Current Every Day Smoker     Packs/day: 0.50     Years: 35.00    Smokeless tobacco: Never Used    Alcohol use No    Drug use: No    Sexual activity: Not on file     Review of Systems   Constitutional: Negative for activity change, chills, diaphoresis, fatigue, fever and unexpected weight change.   HENT: Positive for nosebleeds and sore throat. Negative for congestion, postnasal drip, rhinorrhea and trouble swallowing.    Eyes: Negative for visual disturbance.   Respiratory: Positive for cough and shortness of breath. Negative for apnea, chest tightness and wheezing.         Hemoptysis   Cardiovascular: Negative for chest pain, palpitations and leg swelling.   Gastrointestinal: Negative for abdominal distention, abdominal pain, blood in stool, constipation, diarrhea, nausea and vomiting.        No hematemesis   Endocrine: Negative for polydipsia, polyphagia and polyuria.   Genitourinary: Negative for decreased urine volume, difficulty urinating, dysuria,  frequency, hematuria and urgency.   Musculoskeletal: Negative for arthralgias, back pain, gait problem, joint swelling and myalgias.   Skin: Negative for pallor, rash and wound.   Neurological: Negative for dizziness, seizures, syncope, facial asymmetry, speech difficulty, weakness, light-headedness, numbness and headaches.   Psychiatric/Behavioral: Negative for confusion. The patient is not nervous/anxious.    All other systems reviewed and are negative.    Objective:     Vital Signs (Most Recent):  Temp: 97.7 °F (36.5 °C) (09/22/17 0811)  Pulse: (!) 58 (09/22/17 0811)  Resp: 18 (09/22/17 0811)  BP: 125/61 (09/22/17 0811)  SpO2: 95 % (09/22/17 0811) Vital Signs (24h Range):  Temp:  [97.1 °F (36.2 °C)-99 °F (37.2 °C)] 97.7 °F (36.5 °C)  Pulse:  [58-78] 58  Resp:  [15-32] 18  SpO2:  [94 %-100 %] 95 %  BP: ()/(50-79) 125/61     Weight: 101 kg (222 lb 9.6 oz)  Body mass index is 32.87 kg/m².    Physical Exam   Constitutional: He is oriented to person, place, and time. He appears well-developed and well-nourished. No distress.   HENT:   Head: Normocephalic and atraumatic.   Eyes: Conjunctivae and EOM are normal. Pupils are equal, round, and reactive to light.   Neck: Normal range of motion. Neck supple. No JVD present.   Cardiovascular: Normal rate, regular rhythm, normal heart sounds and intact distal pulses.  Exam reveals no gallop.    No murmur heard.  Pulmonary/Chest: Effort normal and breath sounds normal. No respiratory distress. He has no wheezes.   Fine crackles to left lung.   Abdominal: Soft. Bowel sounds are normal. He exhibits no distension. There is no tenderness.   Musculoskeletal: Normal range of motion. He exhibits no edema, tenderness or deformity.   Neurological: He is alert and oriented to person, place, and time.   No focal deficits   Skin: Skin is warm and dry. No rash noted. No erythema.   Psychiatric: He has a normal mood and affect. Judgment normal.   Nursing note and vitals reviewed.        Significant Labs:   Recent Lab Results       09/22/17  0543 09/21/17  2042 09/21/17  1420 09/21/17  1415 09/21/17  1150      Albumin 3.1(L)    3.7     Alkaline Phosphatase 51(L)    59     ALT 16    21     Anion Gap 8    10     aPTT     22.7  Comment:  aPTT therapeutic range = 39-69 seconds     AST 13    16     Baso # 0.01 0.00   0.01     Basophil% 0.1 0.0   0.1     Total Bilirubin 0.3  Comment:  For infants and newborns, interpretation of results should be based  on gestational age, weight and in agreement with clinical  observations.  Premature Infant recommended reference ranges:  Up to 24 hours.............<8.0 mg/dL  Up to 48 hours............<12.0 mg/dL  3-5 days..................<15.0 mg/dL  6-29 days.................<15.0 mg/dL      0.4  Comment:  For infants and newborns, interpretation of results should be based  on gestational age, weight and in agreement with clinical  observations.  Premature Infant recommended reference ranges:  Up to 24 hours.............<8.0 mg/dL  Up to 48 hours............<12.0 mg/dL  3-5 days..................<15.0 mg/dL  6-29 days.................<15.0 mg/dL       Blood Culture, Routine   No Growth to date[P] No Growth to date[P]      BNP     33  Comment:  Values of less than 100 pg/ml are consistent with non-CHF populations.     BUN, Bld 21(H)    12     Calcium 8.6(L)    8.9     Chloride 109    105     CO2 24    26     Creatinine 0.8    0.9     Differential Method Automated Automated   Automated     eGFR if  >60    >60.0     eGFR if non  >60  Comment:  Calculation used to obtain the estimated glomerular filtration  rate (eGFR) is the CKD-EPI equation. Since race is unknown   in our information system, the eGFR values for   -American and Non--American patients are given   for each creatinine result.      >60.0  Comment:  Calculation used to obtain the estimated glomerular filtration  rate (eGFR) is the CKD-EPI equation. Since race is  unknown   in our information system, the eGFR values for   -American and Non--American patients are given   for each creatinine result.       Eos # 0.0 0.0   0.0     Eosinophil% 0.0 0.0   0.0     Glucose 104    110     Gran # 11.0(H) 8.5(H)   8.1(H)     Gran% 73.8(H) 82.1(H)   70.8     Hematocrit 39.8(L) 43.0   43.4     Hemoglobin 13.7(L) 15.1   15.4     Coumadin Monitoring INR     1.0  Comment:  Coumadin Therapy:  2.0 - 3.0 for INR for all indicators except mechanical heart valves  and antiphospholipid syndromes which should use 2.5 - 3.5.       Lymph # 2.9 1.7   2.5     Lymph% 19.2 16.7(L)   22.2     MCH 30.6 30.5   30.3     MCHC 34.4 35.1   35.5     MCV 89 87   85     Mono # 1.0 0.1(L)   0.8     Mono% 6.9 1.2(L)   6.7     MPV 10.3 10.3   10.6     Platelets 222 217   230     Potassium 4.1    3.8     Total Protein 6.5    7.6     Protime     10.6     RBC 4.47(L) 4.95   5.09     RDW 13.9 14.0   14.0     Sodium 141    141     WBC 14.84(H) 10.39   11.42                   All pertinent labs within the past 24 hours have been reviewed.    Significant Imaging:   Imaging Results          X-Ray Chest PA And Lateral (Final result)  Result time 09/22/17 08:28:36    Final result by Delano Fields III, MD (09/22/17 08:28:36)                 Impression:         Negative two-view chest x-ray.      Electronically signed by: DELANO FIELDS MD  Date:     09/22/17  Time:    08:28              Narrative:    Two-view chest x-ray.  The  Clinical indication: Pneumonia and hemoptysis     Compared to September 21.    Heart size is normal. The lung fields are clear with minimal chronic changes suggested. No consolidation or effusion.. No acute pulmonary infiltrate.                             CTA Chest Non-Coronary (PE Study) (Final result)  Result time 09/21/17 12:59:16    Final result by Titus Snyder MD (09/21/17 12:59:16)                 Impression:        No pulmonary embolus.   Faint groundglass opacities in  the left upper lobe, likely infectious or inflammatory in nature.  Probable bilateral adrenal adenomas this could be confirmed with MRI.        All CT scans at this facility use dose modulation, iterative reconstruction and/or weight based dosing when appropriate to reduce radiation dose to as low as reasonably achievable.       Electronically signed by: GINA MONTESINOS MD  Date:     09/21/17  Time:    12:59              Narrative:    Exam: CTA CHEST NON CORONARY    Clinical History:   Hemoptysis.  Rule out pulmonary embolus.    Technique: Axial CTA images performed through the chest after the administration of 100 cc intravenous contrast. Maximum intensity projections were performed and interpreted.    Findings:        There is no evidence of pulmonary embolus. Pulmonary artery caliber is normal. The heart, great vessels, and mediastinal structures are within normal limits. No thoracic adenopathy.    Faint groundglass opacities in the left upper lobe.  Mild centrilobular emphysema. No pleural effusions.    Bilateral adrenal nodules measuring 2.7 cm on the left and 1.3 cm on the right.    The bones are intact.                             X-Ray Chest PA And Lateral (Final result)  Result time 09/21/17 12:38:27    Final result by Delano Fields III, MD (09/21/17 12:38:27)                 Impression:         Negative two-view chest x-ray.      Electronically signed by: DELANO FIELDS MD  Date:     09/21/17  Time:    12:38              Narrative:    Two-view chest x-ray.    Clinical indication: Asthma     Heart size is normal. The lung fields are clear. No acute pulmonary infiltrate.                             I have reviewed all pertinent imaging results/findings within the past 24 hours.

## 2017-09-22 NOTE — ASSESSMENT & PLAN NOTE
- IV Rocephin and doxycycline.  - O2 supplementation and neb treatments as needed.  - Pulmonology consult.

## 2017-09-23 VITALS
SYSTOLIC BLOOD PRESSURE: 118 MMHG | OXYGEN SATURATION: 95 % | RESPIRATION RATE: 20 BRPM | BODY MASS INDEX: 32.97 KG/M2 | HEIGHT: 69 IN | WEIGHT: 222.63 LBS | DIASTOLIC BLOOD PRESSURE: 66 MMHG | HEART RATE: 74 BPM | TEMPERATURE: 98 F

## 2017-09-23 PROBLEM — E87.6 HYPOKALEMIA: Status: ACTIVE | Noted: 2017-09-23

## 2017-09-23 PROBLEM — R04.0 SEVERE EPISTAXIS: Status: ACTIVE | Noted: 2017-09-23

## 2017-09-23 LAB
ALBUMIN SERPL BCP-MCNC: 3.1 G/DL
ALP SERPL-CCNC: 45 U/L
ALT SERPL W/O P-5'-P-CCNC: 16 U/L
ANION GAP SERPL CALC-SCNC: 9 MMOL/L
AST SERPL-CCNC: 19 U/L
BASOPHILS # BLD AUTO: 0.02 K/UL
BASOPHILS NFR BLD: 0.2 %
BILIRUB SERPL-MCNC: 0.5 MG/DL
BUN SERPL-MCNC: 22 MG/DL
CALCIUM SERPL-MCNC: 8.1 MG/DL
CHLORIDE SERPL-SCNC: 106 MMOL/L
CO2 SERPL-SCNC: 24 MMOL/L
CREAT SERPL-MCNC: 0.8 MG/DL
DIFFERENTIAL METHOD: ABNORMAL
EOSINOPHIL # BLD AUTO: 0 K/UL
EOSINOPHIL NFR BLD: 0.1 %
ERYTHROCYTE [DISTWIDTH] IN BLOOD BY AUTOMATED COUNT: 14.4 %
EST. GFR  (AFRICAN AMERICAN): >60 ML/MIN/1.73 M^2
EST. GFR  (NON AFRICAN AMERICAN): >60 ML/MIN/1.73 M^2
GLUCOSE SERPL-MCNC: 129 MG/DL
HCT VFR BLD AUTO: 36.9 %
HGB BLD-MCNC: 12.7 G/DL
KOH PREP SPEC: NORMAL
KOH PREP SPEC: NORMAL
LYMPHOCYTES # BLD AUTO: 3.6 K/UL
LYMPHOCYTES NFR BLD: 33.4 %
MCH RBC QN AUTO: 30 PG
MCHC RBC AUTO-ENTMCNC: 34.4 G/DL
MCV RBC AUTO: 87 FL
MONOCYTES # BLD AUTO: 0.9 K/UL
MONOCYTES NFR BLD: 8.6 %
NEUTROPHILS # BLD AUTO: 6.3 K/UL
NEUTROPHILS NFR BLD: 57.7 %
PLATELET # BLD AUTO: 180 K/UL
PMV BLD AUTO: 9.8 FL
POTASSIUM SERPL-SCNC: 3.3 MMOL/L
PROT SERPL-MCNC: 6.1 G/DL
RBC # BLD AUTO: 4.23 M/UL
SODIUM SERPL-SCNC: 139 MMOL/L
WBC # BLD AUTO: 10.83 K/UL

## 2017-09-23 PROCEDURE — G0378 HOSPITAL OBSERVATION PER HR: HCPCS

## 2017-09-23 PROCEDURE — 96365 THER/PROPH/DIAG IV INF INIT: CPT

## 2017-09-23 PROCEDURE — 25000003 PHARM REV CODE 250: Performed by: INTERNAL MEDICINE

## 2017-09-23 PROCEDURE — 94761 N-INVAS EAR/PLS OXIMETRY MLT: CPT

## 2017-09-23 PROCEDURE — 96375 TX/PRO/DX INJ NEW DRUG ADDON: CPT

## 2017-09-23 PROCEDURE — 36415 COLL VENOUS BLD VENIPUNCTURE: CPT

## 2017-09-23 PROCEDURE — 96361 HYDRATE IV INFUSION ADD-ON: CPT

## 2017-09-23 PROCEDURE — 25000003 PHARM REV CODE 250: Performed by: NURSE PRACTITIONER

## 2017-09-23 PROCEDURE — 85025 COMPLETE CBC W/AUTO DIFF WBC: CPT

## 2017-09-23 PROCEDURE — 80053 COMPREHEN METABOLIC PANEL: CPT

## 2017-09-23 PROCEDURE — C9113 INJ PANTOPRAZOLE SODIUM, VIA: HCPCS | Performed by: EMERGENCY MEDICINE

## 2017-09-23 PROCEDURE — 99232 SBSQ HOSP IP/OBS MODERATE 35: CPT | Mod: 25,,, | Performed by: INTERNAL MEDICINE

## 2017-09-23 PROCEDURE — 25000003 PHARM REV CODE 250: Performed by: ORTHOPAEDIC SURGERY

## 2017-09-23 PROCEDURE — 63600175 PHARM REV CODE 636 W HCPCS: Performed by: EMERGENCY MEDICINE

## 2017-09-23 RX ORDER — MOXIFLOXACIN HYDROCHLORIDE 400 MG/1
400 TABLET ORAL DAILY
Qty: 5 TABLET | Refills: 0 | Status: SHIPPED | OUTPATIENT
Start: 2017-09-23

## 2017-09-23 RX ORDER — POTASSIUM CHLORIDE 20 MEQ/15ML
40 SOLUTION ORAL ONCE
Status: COMPLETED | OUTPATIENT
Start: 2017-09-23 | End: 2017-09-23

## 2017-09-23 RX ADMIN — ATORVASTATIN CALCIUM 80 MG: 40 TABLET, FILM COATED ORAL at 08:09

## 2017-09-23 RX ADMIN — BENAZEPRIL HYDROCHLORIDE 40 MG: 20 TABLET, FILM COATED ORAL at 08:09

## 2017-09-23 RX ADMIN — SALINE NASAL SPRAY 2 SPRAY: 1.5 SOLUTION NASAL at 07:09

## 2017-09-23 RX ADMIN — SALINE NASAL SPRAY 2 SPRAY: 1.5 SOLUTION NASAL at 06:09

## 2017-09-23 RX ADMIN — PANTOPRAZOLE SODIUM 40 MG: 40 INJECTION, POWDER, FOR SOLUTION INTRAVENOUS at 09:09

## 2017-09-23 RX ADMIN — SALINE NASAL SPRAY 2 SPRAY: 1.5 SOLUTION NASAL at 08:09

## 2017-09-23 RX ADMIN — DOXYCYCLINE 100 MG: 100 INJECTION, POWDER, LYOPHILIZED, FOR SOLUTION INTRAVENOUS at 08:09

## 2017-09-23 RX ADMIN — POTASSIUM CHLORIDE 40 MEQ: 20 SOLUTION ORAL at 08:09

## 2017-09-23 RX ADMIN — EZETIMIBE 10 MG: 10 TABLET ORAL at 08:09

## 2017-09-23 RX ADMIN — MUPIROCIN: 20 OINTMENT TOPICAL at 08:09

## 2017-09-23 RX ADMIN — FUROSEMIDE 20 MG: 20 TABLET ORAL at 08:09

## 2017-09-23 RX ADMIN — SALINE NASAL SPRAY 2 SPRAY: 1.5 SOLUTION NASAL at 09:09

## 2017-09-23 RX ADMIN — AMLODIPINE BESYLATE 10 MG: 10 TABLET ORAL at 08:09

## 2017-09-23 NOTE — PROGRESS NOTES
Went over discharge instructions with patient.   Stressed importance of making and keeping all follow ups.   Patient verbalized understanding and has no questions in regards to discharge.  IV removed, catheter intact.  Telemetry box removed.  Patient dressed and waiting on transportation home.

## 2017-09-23 NOTE — ASSESSMENT & PLAN NOTE
Assistance with smoking cessation was offered, including:  []  Medications  [x]  Counseling  []  Printed Information on Smoking Cessation  []  Referral to a Smoking Cessation Program

## 2017-09-23 NOTE — PROGRESS NOTES
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  Care everywhere was seen 09/20 by PCP with hemoptysis  Plavix for stent 2013    Hospital/ICU Course:  IVF  Abx  NPO  Night nurse report recurrent hemoptysis overnight    09/23: at baseline  No further hemoptysis or epistaxis  Seen by Dr White    Interval History:     Tolerated therapies well    Objective:     Vital Signs (Most Recent):  Temp: 98 °F (36.7 °C) (09/23/17 0000)  Pulse: 68 (09/23/17 0000)  Resp: 16 (09/23/17 0000)  BP: 138/74 (09/23/17 0000)  SpO2: 96 % (09/23/17 0000) Vital Signs (24h Range):  Temp:  [97.7 °F (36.5 °C)-98.8 °F (37.1 °C)] 98 °F (36.7 °C)  Pulse:  [58-95] 68  Resp:  [5-33] 16  SpO2:  [92 %-98 %] 96 %  BP: (108-143)/(58-84) 138/74     Weight: 101 kg (222 lb 9.6 oz)  Body mass index is 32.87 kg/m².    No intake or output data in the 24 hours ending 09/23/17 0722    Physical Exam   Constitutional: He is oriented to person, place, and time. He appears well-developed and well-nourished.   HENT:   Head: Normocephalic and atraumatic.   Nose: Nose normal.   Mouth/Throat: Oropharynx is clear and moist.   Eyes: Conjunctivae and EOM are normal. Pupils are equal, round, and reactive to light.   Neck: Normal range of motion. Neck supple.   Cardiovascular: Normal rate, regular rhythm, normal heart sounds and intact distal pulses.    Pulmonary/Chest: Effort normal  and breath sounds normal. No respiratory distress. He has no wheezes. He has no rales.   Abdominal: Soft. Bowel sounds are normal.   Musculoskeletal: Normal range of motion. He exhibits no edema.   Neurological: He is alert and oriented to person, place, and time. No cranial nerve deficit.   Skin: Skin is warm and dry.   Psychiatric: He has a normal mood and affect.   Nursing note and vitals reviewed.      Vents:  Oxygen Concentration (%): 28 (09/22/17 1215)    Lines/Drains/Airways     Peripheral Intravenous Line                 Peripheral IV - Single Lumen 09/21/17 1415 Left Antecubital 1 day                Significant Labs:    CBC/Anemia Profile:    Recent Labs  Lab 09/21/17  2042 09/22/17  0543 09/23/17  0411   WBC 10.39 14.84* 10.83   HGB 15.1 13.7* 12.7*   HCT 43.0 39.8* 36.9*    222 180   MCV 87 89 87   RDW 14.0 13.9 14.4        Chemistries:    Recent Labs  Lab 09/21/17  1150 09/22/17  0543 09/23/17  0411    141 139   K 3.8 4.1 3.3*    109 106   CO2 26 24 24   BUN 12 21* 22*   CREATININE 0.9 0.8 0.8   CALCIUM 8.9 8.6* 8.1*   ALBUMIN 3.7 3.1* 3.1*   PROT 7.6 6.5 6.1   BILITOT 0.4 0.3 0.5   ALKPHOS 59 51* 45*   ALT 21 16 16   AST 16 13 19       Blood Culture:   Recent Labs  Lab 09/21/17  1415 09/21/17  1420   LABBLOO No Growth to date  No Growth to date No Growth to date  No Growth to date     POCT Glucose: No results for input(s): POCTGLUCOSE in the last 48 hours.  Respiratory Culture:   Recent Labs  Lab 09/22/17  1140   GSRESP <10 epithelial cells per low power field.  No WBC's or organisms seen  <10 epithelial cells per low power field.  No WBC's or organisms seen     All pertinent labs within the past 24 hours have been reviewed.    Significant Imaging:  I have reviewed and interpreted all pertinent imaging results/findings within the past 24 hours.    Assessment/Plan:     ENT   Severe epistaxis    SP eval by ENT  Follow recs  Out patient follow up        Pulmonary   * Hemoptysis     SP Bronch  NON diagnostic    Washes, BAL, cultures, cytology sent  Needs to follow in office for results        Pneumonia of left upper lobe due to infectious organism    Abx  Prevnar 13 prior to discharge  SCD for now    Discharge on Avelox        Emphysema with both acute and chronic bronchitis    Smoking cessation  Outpatient PFT        Renal/   Hypokalemia    KCL ordered        Other   Tobacco use    Assistance with smoking cessation was offered, including:  []  Medications  [x]  Counseling  []  Printed Information on Smoking Cessation  []  Referral to a Smoking Cessation Program             Okay to discharge home: F/U with ENT and Pulmonary    DW Team     Wan Senior MD  Critical Care Medicine  [facility]

## 2017-09-23 NOTE — DISCHARGE SUMMARY
"[facility] Hospital Medicine  Discharge Summary      Patient Name: Stanley Schwab  MRN: [id number]  Admission Date: 9/21/2017  Hospital Length of Stay: 0 days  Discharge Date and Time:  09/23/2017 10:42 AM  Attending Physician: Rowena Alves MD   Discharging Provider: BRENNA Fam  Primary Care Provider: Ever Rubalcava MD      HPI:   Mr. Schwab is a 57yo AA male with a PMHx of CAD with remote PCI (DAMON) of RCA in 2013, HTN, HLD, GERD, OA, and tobacco use, who presented to the ED with c/o hemoptysis since Tuesday.  Associated SOB, nose bleed, and sore throat.  Denies any chest pain, palpitations, SLOAN, wheezing, orthopnea, PND, abdominal pain, N/V/D, dysphagia, dyspepsia, rhinorrhea, lightheadedness/dizziness, syncope, weight loss, or fever.  Patient reports 5 episodes of blood streaked sputum from Tuesday to Thursday.  Thursday morning while at work, patient coughed and reports he "felt something pop loose in my throat", and noticed paige blood with clots.  While in ED, he had another episode stating he coughed up "straight blood".  He saw PCP on 9/20, given steroid and Augmentin.  Patient is on ASA and Plavix.  CT chest revealed faint groundglass opacities in the left upper lobe, likely infectious or inflammatory in nature.  H&H stable at 15/43.  Case discussed with Pulmonology.  Hospital Medicine consulted for admission.    Procedure(s) (LRB):  BRONCHOSCOPY (Bilateral)      Indwelling Lines/Drains at time of discharge:   Lines/Drains/Airways          No matching active lines, drains, or airways        Hospital Course:   Patient admitted.  IV abx initiated.  CBC closely monitored.  This AM, patient appears comfortable without complaints.  H&H slightly down at 14/40.  VS stable.  ASA and Plavix held.  No further episodes of hemoptysis.  Case discussed with Pulmonology.  Bronchoscopy performed - no source of bleeding noted. ENT consulted - patient had nasal endoscopy which showed normal " exam with fresh blood in left nasal cavity and nasopharynx, but no active bleeding. Pt observed overnight with no additional bleeding episodes. He is to follow up with ENT outpatient in 2 weeks. Patient was seen and examined today and deemed stable for discharge home.     Consults:   Consults         Status Ordering Provider     Inpatient consult to ENT  Once     Provider:  Rio Bauer MD    Completed LINDA ROBERTSON     Inpatient consult to Pulmonology  Once     Provider:  Ever Ramsay MD    Acknowledged ANA ANDREWS     Inpatient consult to Pulmonology  Once     Provider:  Ever Ramsay MD    Completed ANA ANDREWS     Inpatient consult to Respiratory Care  Once     Provider:  (Not yet assigned)    Acknowledged LILLIAN PEREZ          Significant Diagnostic Studies: Labs:   CMP   Recent Labs  Lab 09/21/17  1150 09/22/17  0543 09/23/17  0411    141 139   K 3.8 4.1 3.3*    109 106   CO2 26 24 24    104 129*   BUN 12 21* 22*   CREATININE 0.9 0.8 0.8   CALCIUM 8.9 8.6* 8.1*   PROT 7.6 6.5 6.1   ALBUMIN 3.7 3.1* 3.1*   BILITOT 0.4 0.3 0.5   ALKPHOS 59 51* 45*   AST 16 13 19   ALT 21 16 16   ANIONGAP 10 8 9   ESTGFRAFRICA >60.0 >60 >60   EGFRNONAA >60.0 >60 >60   , CBC   Recent Labs  Lab 09/21/17  2042 09/22/17  0543 09/23/17  0411   WBC 10.39 14.84* 10.83   HGB 15.1 13.7* 12.7*   HCT 43.0 39.8* 36.9*    222 180    and All labs within the past 24 hours have been reviewed    Pending Diagnostic Studies:     Procedure Component Value Units Date/Time    Cytology Specimen- Pulmonary Medical Cytology [[id number]] Collected:  09/22/17 1141    Order Status:  Sent Lab Status:  In process Updated:  09/22/17 1213    Specimen:  Bronch wash         Final Active Diagnoses:    Diagnosis Date Noted POA    PRINCIPAL PROBLEM:  Hemoptysis [R04.2] 09/21/2017 Yes    Severe epistaxis [R04.0] 09/23/2017 Yes    Hypokalemia [E87.6] 09/23/2017 Unknown    CAD (coronary artery disease) with h/o PCI (DAMON) of  RCA in 2013 [I25.10] 09/22/2017 Yes     Chronic    Emphysema with both acute and chronic bronchitis [J44.0, J20.9] 09/22/2017 Unknown     Chronic    Pneumonia of left upper lobe due to infectious organism [J18.1] 09/21/2017 Yes    Essential hypertension [I10] 09/21/2017 Unknown     Chronic    Hyperlipidemia [E78.5] 09/21/2017 Unknown     Chronic    Tobacco use [Z72.0] 09/21/2017 Yes     Chronic      Problems Resolved During this Admission:    Diagnosis Date Noted Date Resolved POA      No new Assessment & Plan notes have been filed under this hospital service since the last note was generated.  Service: Hospital Medicine      Discharged Condition: stable    Disposition: Home or Self Care    Follow Up:  Follow-up Information     Ever Rubalcava MD. Schedule an appointment as soon as possible for a visit in 2 days.    Specialty:  Family Medicine  Why:  Follow up with your primary doctor in the next 2-3 days for a re-check.  Return to the emergency department for any worsening signs or symptoms.  Contact information:  [address] [place] [address] [phone]             Ever Ramsay MD. Schedule an appointment as soon as possible for a visit in 4 days.    Specialty:  Pulmonary Disease  Why:  Dr. Ramsay will call you tomorrow to arrange a follow up appointment on Monday, Sept 25th.  Return to the Emergency Department immediately for any worsening sign.  Contact information:  [address]  [place] [address] [phone]             Xin White MD In 2 weeks.    Specialty:  Otolaryngology  Why:  for recheck  Contact information:  [address] [place] [address] [address] [place] [address] [phone]             Ever Rubalcava MD In 1 week.    Specialty:  Family Medicine  Contact information:  [address] [facility] [address] [phone]                 Patient Instructions:     Diet Low Sodium, 2gm     Diet Cardiac     Activity as tolerated     Call MD for:  difficulty breathing  or increased cough     Call MD for:   Order Comments: Call if coughing up copious amounts of blood       Medications:  Reconciled Home Medications:   Current Discharge Medication List      START taking these medications    Details   albuterol 90 mcg/actuation inhaler Inhale 1-2 puffs into the lungs every 6 (six) hours as needed for Wheezing (coughing).  Qty: 1 Inhaler, Refills: 0      predniSONE (DELTASONE) 20 MG tablet Take 2 tablets (40 mg total) by mouth once daily.  Qty: 10 tablet, Refills: 0         CONTINUE these medications which have NOT CHANGED    Details   amlodipine (NORVASC) 10 MG tablet Take 10 mg by mouth once daily.      aspirin (ECOTRIN) 81 MG EC tablet Take 81 mg by mouth once daily.      atorvastatin (LIPITOR) 80 MG tablet Take 80 mg by mouth once daily.      benazepril (LOTENSIN) 40 MG tablet Take 40 mg by mouth once daily.      clopidogrel (PLAVIX) 75 mg tablet Take 75 mg by mouth once daily.      ezetimibe (ZETIA) 10 mg tablet Take 10 mg by mouth once daily.      furosemide (LASIX) 20 MG tablet Take 20 mg by mouth 2 (two) times daily.      KRILL/OM-3/DHA/EPA/PHOSPHO/AST (MEGARED OMEGA-3 KRILL OIL ORAL) Take by mouth.      meloxicam (MOBIC) 15 MG tablet Take 15 mg by mouth once daily.      nebivolol (BYSTOLIC) 2.5 MG Tab Take 10 mg by mouth once daily.      pantoprazole (PROTONIX) 40 MG tablet Take 40 mg by mouth once daily.      vitamin D 1000 units Tab Take 2,000 Units by mouth once daily.      amoxicillin-clavulanate 875-125mg (AUGMENTIN) 875-125 mg per tablet TK 1 T PO BID FOR 10 DAYS  Refills: 0           Time spent on the discharge of patient: 45 minutes      SONIA Fam-RUDY  Department of Hospital Medicine  [facility]

## 2017-09-23 NOTE — PROGRESS NOTES
Pt with c/o h/a pt just taken off npo. Pt is taking icechips. Pt with sob. Pt coughing up bloody mucus and secretions. Suction in use. norco administered po prn pain. norvasc and lasix given po. Pt states he does not feel he can take anymore at this time. Notified abel Patricio .

## 2017-09-23 NOTE — SUBJECTIVE & OBJECTIVE
Interval History:     Tolerated therapies well    Objective:     Vital Signs (Most Recent):  Temp: 98 °F (36.7 °C) (09/23/17 0000)  Pulse: 68 (09/23/17 0000)  Resp: 16 (09/23/17 0000)  BP: 138/74 (09/23/17 0000)  SpO2: 96 % (09/23/17 0000) Vital Signs (24h Range):  Temp:  [97.7 °F (36.5 °C)-98.8 °F (37.1 °C)] 98 °F (36.7 °C)  Pulse:  [58-95] 68  Resp:  [5-33] 16  SpO2:  [92 %-98 %] 96 %  BP: (108-143)/(58-84) 138/74     Weight: 101 kg (222 lb 9.6 oz)  Body mass index is 32.87 kg/m².    No intake or output data in the 24 hours ending 09/23/17 0722    Physical Exam   Constitutional: He is oriented to person, place, and time. He appears well-developed and well-nourished.   HENT:   Head: Normocephalic and atraumatic.   Nose: Nose normal.   Mouth/Throat: Oropharynx is clear and moist.   Eyes: Conjunctivae and EOM are normal. Pupils are equal, round, and reactive to light.   Neck: Normal range of motion. Neck supple.   Cardiovascular: Normal rate, regular rhythm, normal heart sounds and intact distal pulses.    Pulmonary/Chest: Effort normal and breath sounds normal. No respiratory distress. He has no wheezes. He has no rales.   Abdominal: Soft. Bowel sounds are normal.   Musculoskeletal: Normal range of motion. He exhibits no edema.   Neurological: He is alert and oriented to person, place, and time. No cranial nerve deficit.   Skin: Skin is warm and dry.   Psychiatric: He has a normal mood and affect.   Nursing note and vitals reviewed.      Vents:  Oxygen Concentration (%): 28 (09/22/17 1215)    Lines/Drains/Airways     Peripheral Intravenous Line                 Peripheral IV - Single Lumen 09/21/17 1415 Left Antecubital 1 day                Significant Labs:    CBC/Anemia Profile:    Recent Labs  Lab 09/21/17  2042 09/22/17  0543 09/23/17  0411   WBC 10.39 14.84* 10.83   HGB 15.1 13.7* 12.7*   HCT 43.0 39.8* 36.9*    222 180   MCV 87 89 87   RDW 14.0 13.9 14.4        Chemistries:    Recent Labs  Lab  09/21/17  1150 09/22/17  0543 09/23/17  0411    141 139   K 3.8 4.1 3.3*    109 106   CO2 26 24 24   BUN 12 21* 22*   CREATININE 0.9 0.8 0.8   CALCIUM 8.9 8.6* 8.1*   ALBUMIN 3.7 3.1* 3.1*   PROT 7.6 6.5 6.1   BILITOT 0.4 0.3 0.5   ALKPHOS 59 51* 45*   ALT 21 16 16   AST 16 13 19       Blood Culture:   Recent Labs  Lab 09/21/17  1415 09/21/17  1420   LABBLOO No Growth to date  No Growth to date No Growth to date  No Growth to date     POCT Glucose: No results for input(s): POCTGLUCOSE in the last 48 hours.  Respiratory Culture:   Recent Labs  Lab 09/22/17  1140   GSRESP <10 epithelial cells per low power field.  No WBC's or organisms seen  <10 epithelial cells per low power field.  No WBC's or organisms seen     All pertinent labs within the past 24 hours have been reviewed.    Significant Imaging:  I have reviewed and interpreted all pertinent imaging results/findings within the past 24 hours.

## 2017-09-23 NOTE — PLAN OF CARE
Problem: Patient Care Overview  Goal: Plan of Care Review  Outcome: Ongoing (interventions implemented as appropriate)  Pt has remained injury free  This shift with family at bedside. Ambulating to bathroom. Hemoptysis all shift. Bronchoscopy today revealed no source of bleeding. Referral for ENT eval tomorrow. Continue care plan.

## 2017-09-23 NOTE — PLAN OF CARE
Problem: Fall Risk (Adult)  Goal: Absence of Falls  Patient will demonstrate the desired outcomes by discharge/transition of care.   POC reviewed with pt and spouse. Verbalized understanding. VSS. Pt noted with productive cough. Declines PRN cough medication. Denies pain/discomfort this shift.  Safety precautions in place. Spouse at bedside. POC on going.

## 2017-09-23 NOTE — ASSESSMENT & PLAN NOTE
SP Bronch  NON diagnostic    Washes, BAL, cultures, cytology sent  Needs to follow in office for results

## 2017-09-25 ENCOUNTER — OFFICE VISIT (OUTPATIENT)
Dept: PULMONOLOGY | Facility: CLINIC | Age: 57
End: 2017-09-25
Payer: COMMERCIAL

## 2017-09-25 VITALS
WEIGHT: 222.44 LBS | BODY MASS INDEX: 32.95 KG/M2 | OXYGEN SATURATION: 99 % | HEART RATE: 61 BPM | SYSTOLIC BLOOD PRESSURE: 118 MMHG | DIASTOLIC BLOOD PRESSURE: 72 MMHG | HEIGHT: 69 IN | RESPIRATION RATE: 12 BRPM

## 2017-09-25 DIAGNOSIS — R04.0 ACUTE POSTERIOR EPISTAXIS: Primary | ICD-10-CM

## 2017-09-25 PROBLEM — I25.110 CORONARY ARTERY DISEASE WITH UNSTABLE ANGINA PECTORIS: Chronic | Status: ACTIVE | Noted: 2017-09-22

## 2017-09-25 PROBLEM — M50.30 DDD (DEGENERATIVE DISC DISEASE), CERVICAL: Status: ACTIVE | Noted: 2017-09-25

## 2017-09-25 LAB
BACTERIA SPEC AEROBE CULT: NO GROWTH
BACTERIA SPEC AEROBE CULT: NORMAL
GRAM STN SPEC: NORMAL

## 2017-09-25 PROCEDURE — 3008F BODY MASS INDEX DOCD: CPT | Mod: S$GLB,,, | Performed by: INTERNAL MEDICINE

## 2017-09-25 PROCEDURE — 99999 PR PBB SHADOW E&M-EST. PATIENT-LVL IV: CPT | Mod: PBBFAC,,, | Performed by: INTERNAL MEDICINE

## 2017-09-25 PROCEDURE — 99215 OFFICE O/P EST HI 40 MIN: CPT | Mod: S$GLB,,, | Performed by: INTERNAL MEDICINE

## 2017-09-25 RX ORDER — LEVOCETIRIZINE DIHYDROCHLORIDE 5 MG/1
5 TABLET, FILM COATED ORAL
COMMUNITY
Start: 2017-01-26 | End: 2018-01-26

## 2017-09-25 RX ORDER — VITAMIN E 268 MG
800 CAPSULE ORAL
COMMUNITY

## 2017-09-25 RX ORDER — CYCLOBENZAPRINE HCL 10 MG
10 TABLET ORAL
COMMUNITY
Start: 2015-12-29

## 2017-09-25 RX ORDER — OXYMETAZOLINE HCL 0.05 %
2 SPRAY, NON-AEROSOL (ML) NASAL 2 TIMES DAILY
Qty: 15 ML | Refills: 1 | Status: SHIPPED | OUTPATIENT
Start: 2017-09-25 | End: 2017-10-02

## 2017-09-25 NOTE — PROGRESS NOTES
Subjective:       Patient ID: Stanley Schwab is a 56 y.o. male.    Chief Complaint:   He   presents for evaluation and treatment of epistasis after being discharged from the hospital  3  days ago. Since discharge symptoms have gradually improving course since that time - but is still occasionally coughing up some blood following episodes when hisnose drains posteriorly, tickles his throat and he leans forward and starts coughing.. Patient denies cough or sputum production. Symptoms are aggravated by leaning forward. Symptoms improve with rest.  Respiratory: positive for cough and hemoptysis; Cardiovascular: no chest pain or palpitations.  Patient currently is not on home oxygen therapy..    He has recently had several episodes of epistaxis, all self limited.  Before he begins to cough, he has a sensation of there being a tickle in the back of his throat, and he then coughs up blood.  He has no issues with nasal congestion or obstruction.  He is a smoker, and has a long smoking history.  He is on Plavix, and has a diagnosis of hypertension.  He has had a bronchoscopy, but it was done through the endotracheal tube, and Dr. Senior wanted evaluation of his nasal cavity as well as hypopharynx and larynx. ENT evaluation noted blood clot from the left side.      REVIEW OF RECORDS FROM THE HOSPITAL [facility] Hospital Medicine  Discharge Summary  Patient Name: Stanley Schwab  MRN: [id number]  Admission Date: 9/21/2017  Hospital Length of Stay: 0 days  Discharge Date and Time:  09/23/2017 10:42 AM  Attending Physician: Rowena Alves MD   Discharging Provider: SONIA Fam-RUDY  Primary Care Provider: Ever Rubalcava MD     HPI:   Mr. Schwab is a 57yo AA male with a PMHx of CAD with remote PCI (DAMON) of RCA in 2013, HTN, HLD, GERD, OA, and tobacco use, who presented to the ED with c/o hemoptysis since Tuesday.  Associated SOB, nose bleed, and sore throat.  Denies any chest pain, palpitations, SLOAN,  "wheezing, orthopnea, PND, abdominal pain, N/V/D, dysphagia, dyspepsia, rhinorrhea, lightheadedness/dizziness, syncope, weight loss, or fever.  Patient reports 5 episodes of blood streaked sputum from Tuesday to Thursday.  Thursday morning while at work, patient coughed and reports he "felt something pop loose in my throat", and noticed paige blood with clots.  While in ED, he had another episode stating he coughed up "straight blood".  He saw PCP on 9/20, given steroid and Augmentin.  Patient is on ASA and Plavix.  CT chest revealed faint groundglass opacities in the left upper lobe, likely infectious or inflammatory in nature.  H&H stable at 15/43.  Case discussed with Pulmonology.  Hospital Medicine consulted for admission.     Procedure(s) (LRB):  BRONCHOSCOPY (Bilateral)      Indwelling Lines/Drains at time of discharge:       Lines/Drains/Airways            No matching active lines, drains, or airways          Hospital Course:   Patient admitted.  IV abx initiated.  CBC closely monitored.  This AM, patient appears comfortable without complaints.  H&H slightly down at 14/40.  VS stable.  ASA and Plavix held.  No further episodes of hemoptysis.  Case discussed with Pulmonology.  Bronchoscopy performed - no source of bleeding noted. ENT consulted - patient had nasal endoscopy which showed normal exam with fresh blood in left nasal cavity and nasopharynx, but no active bleeding. Pt observed overnight with no additional bleeding episodes. He is to follow up with ENT outpatient in 2 weeks. Patient was seen and examined today and deemed stable for discharge home.     Emphysema and Pneumonia    HPI  Past Medical History:   Diagnosis Date    Anticoagulant long-term use     Arthritis     Coronary artery disease     PCI (DAMON) of RCA in 2013    GERD (gastroesophageal reflux disease)     Hypercholesteremia     Hypertension     Kidney stones      Past Surgical History:   Procedure Laterality Date    CORONARY " ANGIOPLASTY WITH STENT PLACEMENT  2013    HERNIA REPAIR      KIDNEY STONE SURGERY      SHOULDER SURGERY Left      Social History     Social History    Marital status:      Spouse name: N/A    Number of children: N/A    Years of education: N/A     Occupational History    Not on file.     Social History Main Topics    Smoking status: Current Every Day Smoker     Packs/day: 0.50     Years: 35.00    Smokeless tobacco: Never Used    Alcohol use No    Drug use: No    Sexual activity: Not on file     Other Topics Concern    Not on file     Social History Narrative    No narrative on file     Review of Systems   HENT: Positive for postnasal drip.    Respiratory: Positive for cough and hemoptysis.        Objective:      Physical Exam   Constitutional: He is oriented to person, place, and time. He appears well-developed and well-nourished.   HENT:   Head: Normocephalic and atraumatic.   Mouth/Throat: Oropharyngeal exudate present.   Eyes: Conjunctivae are normal. Pupils are equal, round, and reactive to light.   Neck: Neck supple. No JVD present. No tracheal deviation present. No thyromegaly present.   Cardiovascular: Normal rate, regular rhythm and normal heart sounds.    Pulmonary/Chest: Effort normal and breath sounds normal.   Abdominal: Soft.   Musculoskeletal: Normal range of motion.   Lymphadenopathy:     He has no cervical adenopathy.   Neurological: He is alert and oriented to person, place, and time.   Skin: Skin is warm and dry.   Nursing note and vitals reviewed.    Personal Diagnostic Review  Chest x-ray: hyperinflation    CT of chest performed on 9/22/2017 without contrast revealed ground glass.  .[email or web address]  No flowsheet data found.      Assessment:       1. Acute posterior epistaxis        Outpatient Encounter Prescriptions as of 9/25/2017   Medication Sig Dispense Refill    amlodipine (NORVASC) 10 MG tablet Take 10 mg by mouth once daily.      atorvastatin (LIPITOR) 80 MG tablet Take 80  mg by mouth once daily.      benazepril (LOTENSIN) 40 MG tablet Take 40 mg by mouth once daily.      clopidogrel (PLAVIX) 75 mg tablet Take 75 mg by mouth once daily.      cyclobenzaprine (FLEXERIL) 10 MG tablet Take 10 mg by mouth.      ezetimibe (ZETIA) 10 mg tablet Take 10 mg by mouth once daily.      furosemide (LASIX) 20 MG tablet Take 20 mg by mouth 2 (two) times daily.      KRILL/OM-3/DHA/EPA/PHOSPHO/AST (MEGARED OMEGA-3 KRILL OIL ORAL) Take by mouth.      levocetirizine (XYZAL) 5 MG tablet Take 5 mg by mouth.      meloxicam (MOBIC) 15 MG tablet Take 15 mg by mouth once daily.      moxifloxacin (AVELOX) 400 mg tablet Take 1 tablet (400 mg total) by mouth once daily. 5 tablet 0    nebivolol (BYSTOLIC) 2.5 MG Tab Take 10 mg by mouth once daily.      pantoprazole (PROTONIX) 40 MG tablet Take 40 mg by mouth once daily.      vitamin D 1000 units Tab Take 2,000 Units by mouth once daily.      vitamin E 400 UNIT capsule Take 800 Units by mouth.      [DISCONTINUED] amoxicillin-clavulanate 875-125mg (AUGMENTIN) 875-125 mg per tablet TK 1 T PO BID FOR 10 DAYS  0    [DISCONTINUED] aspirin (ECOTRIN) 81 MG EC tablet Take 81 mg by mouth once daily.      oxymetazoline (AFRIN, OXYMETAZOLINE,) 0.05 % nasal spray 2 sprays by Nasal route 2 (two) times daily. 15 mL 1     Facility-Administered Encounter Medications as of 9/25/2017   Medication Dose Route Frequency Provider Last Rate Last Dose    [DISCONTINUED] 0.9%  NaCl infusion   Intravenous Continuous PRN Brenda Zazueta CRNA        [DISCONTINUED] fentaNYL injection    PRN Brenda Zazueta CRNA   50 mcg at 09/22/17 1050    [DISCONTINUED] glycopyrrolate injection    PRN Brenda Zazueta CRNA   0.2 mg at 09/22/17 1044    [DISCONTINUED] lidocaine HCL 10 mg/ml (1%) injection    PRN Brenda Zazueta CRNA   50 mg at 09/22/17 1047    [DISCONTINUED] midazolam injection   Intravenous PRN Brenda Zazueta CRNA   2 mg at 09/22/17 1044     [DISCONTINUED] propofol (DIPRIVAN) 10 mg/mL infusion    PRN Brenda Zazueta, CRNA   50 mg at 17 1049     Orders Placed This Encounter   Procedures    CT Sinuses W WO Contrast     Standing Status:   Future     Standing Expiration Date:   2018     Order Specific Question:   Is the patient allergic to iodine or contrast? Has a steroid / antihistamine prep been administered?     Answer:   No     Order Specific Question:   Is the patient on ANY Metformin drug such as Glugophage/Glucovance?           Should be off drug 48 hours after contrast. Check renal function before restart.     Answer:   No     Order Specific Question:   Age > 60 years?     Answer:   No     Order Specific Question:   History of Kidney Disease - including: decreased kidney function, dialysis, kidney transplay, single kidney, kidney cancer, kidney surgery?     Answer:   None     Order Specific Question:   Does the patient have high blood pressure requiring medical treatment?     Answer:   Yes     Order Specific Question:   Diabetes?     Answer:   No     Order Specific Question:   May the Radiologist modify the order per protocol to meet the clinical needs of the patient?     Answer:   Yes     Order Specific Question:   Recist criteria?     Answer:   No    Ambulatory Referral to ENT     Referral Priority:   Routine     Referral Type:   Consultation     Referral Reason:   Specialty Services Required     Requested Specialty:   Otolaryngology     Number of Visits Requested:   1     Plan:       Requested Prescriptions     Signed Prescriptions Disp Refills    oxymetazoline (AFRIN, OXYMETAZOLINE,) 0.05 % nasal spray 15 mL 1     Si sprays by Nasal route 2 (two) times daily.     Acute posterior epistaxis  -     oxymetazoline (AFRIN, OXYMETAZOLINE,) 0.05 % nasal spray; 2 sprays by Nasal route 2 (two) times daily.  Dispense: 15 mL; Refill: 1  -     CT Sinuses W WO Contrast; Future; Expected date: 2017  -     Ambulatory Referral to  ENT           Return in about 2 weeks (around 10/9/2017) for review of progress.     MEDICAL DECISION MAKING: Moderate to high complexity.  Overall, the multiple problems listed are of moderate to high severity that may impact quality of life and activities of daily living. Side effects of medications, treatment plan as well as options and alternatives reviewed and discussed with patient. There was counseling of patient concerning these issues.    Total time spent in face to face counseling and coordination of care - 45 minutes over 50% of time was used in discussion of prognosis, risks, benefits of treatment, instructions and compliance with regimen . Discussion with other physicians or health care providers (homehealth, durable medical equipment providers).

## 2017-09-25 NOTE — LETTER
September 25, 2017      Alvin Zavaleta MD  [address] [place] [address] [address] [place] [id number]           [facility]  [address] [facility] [address] [place] [address]  Phone: [phone]  Fax: [phone]          Patient: Stanley Schwab   MR Number: [id number]   YOB: 1960   Date of Visit: 9/25/2017       Dear Dr. Alvin Zavaleta:    Thank you for referring Stanley Schwab to me for evaluation. Attached you will find relevant portions of my assessment and plan of care.    If you have questions, please do not hesitate to call me. I look forward to following Stanley Schwab along with you.    Sincerely,    Ever Ramsay MD    Enclosure  CC:  No Recipients    If you would like to receive this communication electronically, please contact [email or web address] or [phone] to request more information on [facility] access.    For providers and/or their staff who would like to refer a patient to [facility], please contact us through our one-stop-shop provider referral line, [facility], at [phone].    If you feel you have received this communication in error or would no longer like to receive these types of communications, please e-mail [email or web address]

## 2017-09-25 NOTE — LETTER
September 25, 2017    [facility]  [place], [place]       [facility]  [address] [place] [address]  Phone: [phone]  Fax: [phone] September 25, 2017     Patient: Stanley Schwab    YOB: 1960   Date of Visit: 9/25/2017       To Whom It May Concern:    It is my medical opinion that Stanley Schwab  should remain out of participation until 10/6/2017. He has been out work since 9/21/2017 due to a medical problem.    If you have any questions or concerns, please don't hesitate to call.    Sincerely,          Ever Ramsay MD

## 2017-09-26 LAB
BACTERIA BLD CULT: NORMAL
BACTERIA BLD CULT: NORMAL

## 2017-09-27 ENCOUNTER — TELEPHONE (OUTPATIENT)
Dept: PULMONOLOGY | Facility: CLINIC | Age: 57
End: 2017-09-27

## 2017-09-27 NOTE — TELEPHONE ENCOUNTER
----- Message from Wan Senior MD sent at 9/27/2017  3:28 PM CDT -----  Please inform No cancer cells noted    Wan Senior MD    ORDERING PHYSICIAN(S)  [facility]  CLINICAL DIAGNOSIS/INFORMATION  Clinical information: Hemoptysis  Gross Description  20mls cloudy red fluid  1 thin prep  1 cb  gr  SPECIMEN  1) Bronchial Wash  FINAL PATHOLOGIC DIAGNOSIS  Bronchial wash:  Sparsely cellular specimen negative for malignant cells.  [facility]  Diagnosed by: Rigo Griggs M.D.  (Electronically Signed: 2017-09-27 14:

## 2017-10-03 ENCOUNTER — TELEPHONE (OUTPATIENT)
Dept: RADIOLOGY | Facility: HOSPITAL | Age: 57
End: 2017-10-03

## 2017-10-03 NOTE — ANESTHESIA POSTPROCEDURE EVALUATION
"Anesthesia Post Evaluation    Patient: Stanley Schwab    Procedure(s) Performed: Procedure(s) (LRB):  BRONCHOSCOPY (Bilateral)    Final Anesthesia Type: general  Patient location during evaluation: PACU  Patient participation: Yes- Able to Participate  Level of consciousness: awake and alert and oriented  Post-procedure vital signs: reviewed and stable  Pain management: adequate  Airway patency: patent  PONV status at discharge: No PONV  Anesthetic complications: no      Cardiovascular status: hemodynamically stable  Respiratory status: spontaneous ventilation  Hydration status: euvolemic  Follow-up not needed.        Visit Vitals  /66 (Patient Position: Sitting)   Pulse 74   Temp 36.4 °C (97.6 °F) (Oral)   Resp 20   Ht 5' 9" (1.753 m)   Wt 101 kg (222 lb 9.6 oz)   SpO2 95%   BMI 32.87 kg/m²       Pain/Marty Score: No Data Recorded      "

## 2017-10-24 LAB
FUNGUS SPEC CULT: NORMAL
FUNGUS SPEC CULT: NORMAL

## 2017-11-25 LAB
ACID FAST MOD KINY STN SPEC: NORMAL
ACID FAST MOD KINY STN SPEC: NORMAL
MYCOBACTERIUM SPEC QL CULT: NORMAL
MYCOBACTERIUM SPEC QL CULT: NORMAL

## 2021-01-20 ENCOUNTER — LAB VISIT (OUTPATIENT)
Dept: PRIMARY CARE CLINIC | Facility: OTHER | Age: 61
End: 2021-01-20
Attending: INTERNAL MEDICINE
Payer: COMMERCIAL

## 2021-01-20 DIAGNOSIS — Z20.822 ENCOUNTER FOR LABORATORY TESTING FOR COVID-19 VIRUS: Primary | ICD-10-CM

## 2021-01-20 DIAGNOSIS — R43.8 OTHER DISTURBANCES OF SMELL AND TASTE: ICD-10-CM

## 2021-01-20 PROCEDURE — U0003 INFECTIOUS AGENT DETECTION BY NUCLEIC ACID (DNA OR RNA); SEVERE ACUTE RESPIRATORY SYNDROME CORONAVIRUS 2 (SARS-COV-2) (CORONAVIRUS DISEASE [COVID-19]), AMPLIFIED PROBE TECHNIQUE, MAKING USE OF HIGH THROUGHPUT TECHNOLOGIES AS DESCRIBED BY CMS-2020-01-R: HCPCS

## 2021-01-22 LAB — SARS-COV-2 RNA RESP QL NAA+PROBE: DETECTED

## 2021-01-23 DIAGNOSIS — U07.1 COVID-19 VIRUS DETECTED: ICD-10-CM

## 2023-01-09 NOTE — PLAN OF CARE
Patient state feeling well, but need referral office to schedule the appointment at [facility] office. See anesthesia for meds, vs, and monitoring.Pt adequately sedated per all staff in procedure room. Final timeout done.

## 2024-11-04 NOTE — HOSPITAL COURSE
Esther Booker is a 89 year old female presenting with follow up   Refills needed no  Advance Directives:  not discussed       Denies known Latex allergy or symptoms of Latex sensitivity.    Medications reviewed and updated.   Social History     Tobacco Use   Smoking Status Never   Smokeless Tobacco Never          Health Maintenance Due   Topic Date Due    Influenza Vaccine (1) 09/01/2024    COVID-19 Vaccine (6 - 2024-25 season) 09/01/2024    DM/CKD Microalbumin Ratio  11/21/2024    Depression Screening  04/30/2025            Health Maintenance       Influenza Vaccine (1)  Overdue since 9/1/2024    COVID-19 Vaccine (6 - 2024-25 season)  Overdue since 9/1/2024    DM/CKD Microalbumin Ratio (Yearly)  Due soon on 11/21/2024    Depression Screening (Yearly)  Due soon on 4/30/2025           Following review of the above:  Patient wishes to discuss with clinician:     Note: Refer to final orders and clinician documentation.            Patient admitted.  IV abx initiated.  CBC closely monitored.  This AM, patient appears comfortable without complaints.  H&H slightly down at 14/40.  VS stable.  ASA and Plavix held.  No further episodes of hemoptysis.  Case discussed with Pulmonology.  Bronchoscopy performed - no source of bleeding noted. ENT consulted - patient had nasal endoscopy which showed normal exam with fresh blood in left nasal cavity and nasopharynx, but no active bleeding. Pt observed overnight with no additional bleeding episodes. He is to follow up with ENT outpatient in 2 weeks. Patient was seen and examined today and deemed stable for discharge home.

## 2024-12-03 ENCOUNTER — HOSPITAL ENCOUNTER (EMERGENCY)
Facility: HOSPITAL | Age: 64
Discharge: HOME | End: 2024-12-04
Attending: EMERGENCY MEDICINE
Payer: MEDICARE
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
## 2024-12-03 DIAGNOSIS — R10.13 EPIGASTRIC PAIN: ICD-10-CM

## 2024-12-03 LAB
ALBUMIN SERPL BCP-MCNC: 3.5 G/DL (ref 3.5–5.2)
ALP SERPL-CCNC: 56 U/L (ref 40–150)
ALT SERPL W/O P-5'-P-CCNC: 19 U/L (ref 10–44)
ANION GAP SERPL CALC-SCNC: 8 MMOL/L (ref 8–16)
AST SERPL-CCNC: 20 U/L (ref 10–40)
BASOPHILS # BLD AUTO: 0.05 K/UL (ref 0–0.2)
BASOPHILS NFR BLD: 0.7 % (ref 0–1.9)
BILIRUB SERPL-MCNC: 0.3 MG/DL (ref 0.1–1)
BUN SERPL-MCNC: 11 MG/DL (ref 8–23)
CALCIUM SERPL-MCNC: 8.5 MG/DL (ref 8.7–10.5)
CHLORIDE SERPL-SCNC: 108 MMOL/L (ref 95–110)
CO2 SERPL-SCNC: 23 MMOL/L (ref 23–29)
CREAT SERPL-MCNC: 1 MG/DL (ref 0.5–1.4)
DIFFERENTIAL METHOD BLD: ABNORMAL
EOSINOPHIL # BLD AUTO: 0.1 K/UL (ref 0–0.5)
EOSINOPHIL NFR BLD: 1.3 % (ref 0–8)
ERYTHROCYTE [DISTWIDTH] IN BLOOD BY AUTOMATED COUNT: 14.6 % (ref 11.5–14.5)
EST. GFR  (NO RACE VARIABLE): >60 ML/MIN/1.73 M^2
GLUCOSE SERPL-MCNC: 108 MG/DL (ref 70–110)
HCT VFR BLD AUTO: 40.6 % (ref 40–54)
HCV AB SERPL QL IA: NORMAL
HGB BLD-MCNC: 13.6 G/DL (ref 14–18)
HIV 1+2 AB+HIV1 P24 AG SERPL QL IA: NORMAL
IMM GRANULOCYTES # BLD AUTO: 0.01 K/UL (ref 0–0.04)
IMM GRANULOCYTES NFR BLD AUTO: 0.1 % (ref 0–0.5)
LIPASE SERPL-CCNC: 20 U/L (ref 4–60)
LYMPHOCYTES # BLD AUTO: 2.3 K/UL (ref 1–4.8)
LYMPHOCYTES NFR BLD: 32.1 % (ref 18–48)
MCH RBC QN AUTO: 29.6 PG (ref 27–31)
MCHC RBC AUTO-ENTMCNC: 33.5 G/DL (ref 32–36)
MCV RBC AUTO: 89 FL (ref 82–98)
MONOCYTES # BLD AUTO: 0.6 K/UL (ref 0.3–1)
MONOCYTES NFR BLD: 8 % (ref 4–15)
NEUTROPHILS # BLD AUTO: 4.1 K/UL (ref 1.8–7.7)
NEUTROPHILS NFR BLD: 57.8 % (ref 38–73)
NRBC BLD-RTO: 0 /100 WBC
PLATELET # BLD AUTO: 210 K/UL (ref 150–450)
PMV BLD AUTO: 10.6 FL (ref 9.2–12.9)
POTASSIUM SERPL-SCNC: 3.9 MMOL/L (ref 3.5–5.1)
PROT SERPL-MCNC: 6.7 G/DL (ref 6–8.4)
RBC # BLD AUTO: 4.59 M/UL (ref 4.6–6.2)
SODIUM SERPL-SCNC: 139 MMOL/L (ref 136–145)
WBC # BLD AUTO: 7.01 K/UL (ref 3.9–12.7)

## 2024-12-03 PROCEDURE — 83690 ASSAY OF LIPASE: CPT | Performed by: EMERGENCY MEDICINE

## 2024-12-03 PROCEDURE — 93005 ELECTROCARDIOGRAM TRACING: CPT

## 2024-12-03 PROCEDURE — 99285 EMERGENCY DEPT VISIT HI MDM: CPT | Mod: 25

## 2024-12-03 PROCEDURE — 93010 ELECTROCARDIOGRAM REPORT: CPT | Mod: ,,, | Performed by: INTERNAL MEDICINE

## 2024-12-03 PROCEDURE — 80053 COMPREHEN METABOLIC PANEL: CPT | Performed by: EMERGENCY MEDICINE

## 2024-12-03 PROCEDURE — 86803 HEPATITIS C AB TEST: CPT | Performed by: PHYSICIAN ASSISTANT

## 2024-12-03 PROCEDURE — 25000003 PHARM REV CODE 250: Performed by: EMERGENCY MEDICINE

## 2024-12-03 PROCEDURE — 87389 HIV-1 AG W/HIV-1&-2 AB AG IA: CPT | Performed by: PHYSICIAN ASSISTANT

## 2024-12-03 PROCEDURE — 85025 COMPLETE CBC W/AUTO DIFF WBC: CPT | Performed by: EMERGENCY MEDICINE

## 2024-12-03 RX ORDER — ACETAMINOPHEN 325 MG/1
650 TABLET ORAL
Status: COMPLETED | OUTPATIENT
Start: 2024-12-03 | End: 2024-12-03

## 2024-12-03 RX ADMIN — ACETAMINOPHEN 650 MG: 325 TABLET ORAL at 09:12

## 2024-12-04 VITALS
HEART RATE: 58 BPM | SYSTOLIC BLOOD PRESSURE: 190 MMHG | BODY MASS INDEX: 32.95 KG/M2 | TEMPERATURE: 98 F | DIASTOLIC BLOOD PRESSURE: 90 MMHG | OXYGEN SATURATION: 95 % | RESPIRATION RATE: 16 BRPM | HEIGHT: 69 IN | WEIGHT: 222.44 LBS

## 2024-12-04 LAB
OHS QRS DURATION: 84 MS
OHS QTC CALCULATION: 429 MS

## 2024-12-04 PROCEDURE — 25500020 PHARM REV CODE 255: Performed by: EMERGENCY MEDICINE

## 2024-12-04 RX ADMIN — IOHEXOL 100 ML: 350 INJECTION, SOLUTION INTRAVENOUS at 12:12

## 2024-12-04 NOTE — DISCHARGE INSTRUCTIONS
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
## 2024-12-04 NOTE — ED NOTES
Assumed care of patient. Patient is alert and resting comfortably in bed in NAD. Patient placed on BP and O2 monitoring. Family member at bedside. Patient remains in hospital gown with c-collar in place. Patient updated on plan of care, pt denies any needs or complaints at this time. Bed locked in lowest position, side rails up x2, call light within reach. VSS. Will continue to monitor.    Urinal at bedside, patient informed of need for specimen

## 2024-12-04 NOTE — ED NOTES
Bed: [facility]  Expected date:   Expected time:   Means of arrival:   Comments:

## 2025-05-13 NOTE — ASSESSMENT & PLAN NOTE
Differentials: infectious, bronchial bleed from scarring, neoplasm, out sources like ENT  Bronchoscopy necessary  CT shows blood in right airway  Asp and Plavix need to be held, DW cardiology  If torrential bleeding with possible airway compromise : intubate  May need IR intervention    My diagnostic impression and work-up plan were discussed at length with patient. Risks were discussed. BRONCHOSCOPY procedure. Complications of the procedure discussed in detail with patient. Complications including but not limited to infection that may require hospital admission, bleeding that may require blood transfusion and or hospital admission, perforation of the lung which may require surgery,chance of injury to the throat, windpipe or bronchial tubes, laryngospam, coughing, aspiration, hypoxemia or cardiac arrythmias. Patient expressed and verbalized understanding. The material risks of anesthesia in connection with the procedure including brain damage, paralysis from the neck downwards, paralysis from the waist downwards, loss of function of an arm or a leg, disfigurement (incluing scars)and death were discussed with patient who expressedand verbalized understanding. Alternate treatments and material risks associated with such alternatives were discussed with pateint. These include radiologic surveillance with minimal risk and sugery with an indeterminate risk. The material risks of refusing the procedure was discussed in detail. This includes no diagnosis or confirmation of diagnisis and rendering of appropriate treatment the risk of which depends on the nature of the diagnosed illness. Patient expressed and verbalized understanding. Procedure scheduled for date 09/22/2017. Consent signed. Orders entered. Coagulation studies per orders.   All questions were answered and the patient expressed understanding       No